# Patient Record
Sex: FEMALE | Race: WHITE | NOT HISPANIC OR LATINO | Employment: FULL TIME | URBAN - METROPOLITAN AREA
[De-identification: names, ages, dates, MRNs, and addresses within clinical notes are randomized per-mention and may not be internally consistent; named-entity substitution may affect disease eponyms.]

---

## 2017-08-02 ENCOUNTER — APPOINTMENT (OUTPATIENT)
Dept: LAB | Facility: HOSPITAL | Age: 32
End: 2017-08-02
Payer: COMMERCIAL

## 2017-08-02 ENCOUNTER — TRANSCRIBE ORDERS (OUTPATIENT)
Dept: LAB | Facility: HOSPITAL | Age: 32
End: 2017-08-02

## 2017-08-02 DIAGNOSIS — Z00.8 ENCOUNTER FOR OTHER GENERAL EXAMINATION: Primary | ICD-10-CM

## 2017-08-02 DIAGNOSIS — Z00.8 ENCOUNTER FOR OTHER GENERAL EXAMINATION: ICD-10-CM

## 2017-08-02 LAB
CHOLEST SERPL-MCNC: 198 MG/DL (ref 50–200)
EST. AVERAGE GLUCOSE BLD GHB EST-MCNC: 111 MG/DL
HBA1C MFR BLD: 5.5 % (ref 4.2–6.3)
HDLC SERPL-MCNC: 59 MG/DL (ref 40–60)
LDLC SERPL CALC-MCNC: 119 MG/DL (ref 0–100)
TRIGL SERPL-MCNC: 100 MG/DL

## 2017-08-02 PROCEDURE — 83036 HEMOGLOBIN GLYCOSYLATED A1C: CPT

## 2017-08-02 PROCEDURE — 36415 COLL VENOUS BLD VENIPUNCTURE: CPT

## 2017-08-02 PROCEDURE — 80061 LIPID PANEL: CPT

## 2017-12-18 ENCOUNTER — OFFICE VISIT (OUTPATIENT)
Dept: URGENT CARE | Facility: CLINIC | Age: 32
End: 2017-12-18
Payer: COMMERCIAL

## 2017-12-18 PROCEDURE — S9088 SERVICES PROVIDED IN URGENT: HCPCS

## 2017-12-18 PROCEDURE — 99213 OFFICE O/P EST LOW 20 MIN: CPT

## 2017-12-19 NOTE — PROGRESS NOTES
Assessment  1  Strain of right shoulder, initial encounter (840 9) (Z94 [id number])    Plan  Strain of right shoulder, initial encounter    · MethylPREDNISolone 4 MG Oral Tablet Therapy Pack (Medrol); 24mg day 1 thendecrease by 4 mg po daily for 6 days   · Naproxen 375 MG Oral Tablet; TAKE 1 TABLET 3 TIMES DAILY AS NEEDED    Discussion/Summary  Discussion Summary:   Discussed dx of right shoulder strain instructed to rest ice use NSAID as needed and follow up with PCP  Medication Side Effects Reviewed: Possible side effects of new medications were reviewed with the patient/guardian today  Understands and agrees with treatment plan: The treatment plan was reviewed with the patient/guardian  The patient/guardian understands and agrees with the treatment plan   Counseling Documentation With Imm: The patient was counseled regarding instructions for management,-- patient and family education,-- importance of compliance with treatment  total time of encounter was 25 minutes-- and-- 10 minutes was spent counseling  Follow Up Instructions: Follow Up with your Primary Care Provider in 3-5 days  If your symptoms worsen, go to the nearest [facility] Emergency Department  Chief Complaint  1  Shoulder Pain  Chief Complaint Free Text Note Form: c/o worsening pain in shoulder since yesterday  History of Present Illness  HPI: 28year old female at urgent care with chief complaint of right shoulder pain since lifting something at grocery store yesterday  She states she does a lot of lifting at her job but the pain got worse and really starting to bother her yesterday has not used nay OTC medications   Hospital Based Practices Required Assessment:  Pain Assessment  the patient states they have pain  The pain is located in the shoulder  (on a scale of 0 to 10, the patient rates the pain at 0, at times ranging as high as 8 )  Abuse And Domestic Violence Screen   Yes, the patient is safe at home  -- The patient states no one is hurting them  Depression And Suicide Screen  No, the patient has not had thoughts of hurting themself  Readiness To Learn: Receptive  Barriers To Learning: none  Preferred Learning: verbal  Education Completed: disease/condition,-- medications-- and-- further treatment/follow-up  Teaching Method: verbal  Person Taught: patient  Evaluation Of Learning: verbalized/demonstrated understanding   Shoulder Pain: RODRICK NULL presents with complaints of shoulder pain  Associated symptoms include swelling-- and-- decreased range of motion, but-- no clicking,-- no shoulder bruising,-- no instability,-- no redness,-- no warmth,-- no numbness in the arm,-- no weakness in the arm,-- no pain in the arm,-- no paresthesias,-- no pain in the neck,-- no chest pain,-- no pain in other joints,-- no fever,-- no localized rash-- and-- no generalized rash  Review of Systems  Focused-Female:  Constitutional: No fever, no chills, feels well, no tiredness, no recent weight gain or loss  ENT: no ear ache, no loss of hearing, no nosebleeds or nasal discharge, no sore throat or hoarseness  Cardiovascular: no complaints of slow or fast heart rate, no chest pain, no palpitations, no leg claudication or lower extremity edema  Respiratory: no complaints of shortness of breath, no wheezing, no dyspnea on exertion, no orthopnea or PND  Breasts: no complaints of breast pain, breast lump or nipple discharge  Gastrointestinal: no complaints of abdominal pain, no constipation, no nausea or diarrhea, no vomiting, no bloody stools  Genitourinary: no complaints of dysuria, no incontinence, no pelvic pain, no dysmenorrhea, no vaginal discharge or abnormal vaginal bleeding  Musculoskeletal: as noted in HPI  Integumentary: no complaints of skin rash or lesion, no itching or dry skin, no skin wounds  Neurological: no complaints of headache, no confusion, no numbness or tingling, no dizziness or fainting     ROS Reviewed:   ROS reviewed  Active Problems  1  Acute bacterial bronchitis (466 0,041 9) (J20 8,B96 89)   2  Acute otitis media, left (382 9) (H66 92)   3  Mild concussion (850 9) (S06 0X9A)   4  Renal colic (613 8) (X44)    Past Medical History  1  History of Headache (784 0) (R51)   2  History of Sinus Tachycardia (427 89)   3  History of Urinary tract infection (599 0) (N39 0)  Active Problems And Past Medical History Reviewed: The active problems and past medical history were reviewed and updated today  Family History  Family History    1  Family history of Anemia   2  Family history of Cervical dysplasia   3  Family history of Diabetes   4  Family history of Headache   5  Family history of Heart disease   6  Family history of Hypertension (V17 49)   7  Family history of Hypertension   8  Family history of Urinary tract infection  Family History Reviewed: The family history was reviewed and updated today  Social History   · Former smoker (O89 24) (E90 029)  Social History Reviewed: The social history was reviewed and updated today  The social history was reviewed and is unchanged  Surgical History  Surgical History Reviewed: The surgical history was reviewed and updated today  Current Meds   1  Daily Multiple Vitamins TABS; Therapy: (Karin Glover) to Recorded   2  Mucinex 600 MG Oral Tablet Extended Release 12 Hour; as directed; Therapy: 51KEH3813 to (Last Rx:29Nov2016) Ordered   3  Naproxen  MG Oral Tablet Delayed Release; TAKE 1 TABLET EVERY 12 HOURS DAILY; Therapy: 13RXH1953 to (Evaluate:47Rfx7536)  Requested for: 15EPV2511; Last [id number] Ordered  Medication List Reviewed: The medication list was reviewed and updated today  Allergies  1  No Known Drug Allergies  2   Chocolate    Vitals  Signs   Recorded: 08QEC7780 09:32AM   Temperature: 99 4 F  Heart Rate: 90  Respiration: 16  Systolic: 794  Diastolic: 71  Weight: 004 lb   BMI Calculated: 25 06  BSA Calculated: 1 71  O2 Saturation: 100  Pain Scale: 8    Physical Exam   Constitutional  General appearance: No acute distress, well appearing and well nourished  Eyes  Conjunctiva and lids: No swelling, erythema or discharge  Pupils and irises: Equal, round and reactive to light  Ears, Nose, Mouth, and Throat  External inspection of ears and nose: Normal    Oropharynx: Normal with no erythema, edema, exudate or lesions  Pulmonary  Respiratory effort: No increased work of breathing or signs of respiratory distress  Auscultation of lungs: Clear to auscultation  Cardiovascular  Palpation of heart: Normal PMI, no thrills  Auscultation of heart: Normal rate and rhythm, normal S1 and S2, without murmurs  Lymphatic  Palpation of lymph nodes in neck: No lymphadenopathy  Musculoskeletal  Gait and station: Normal    Digits and nails: Normal without clubbing or cyanosis  Inspection/palpation of joints, bones, and muscles: Abnormal   Appearance - normal  Palpation - normal except as noted: right shoulder tenderness        Signatures   Electronically signed by : Tahmina Live NP; Dec 18 2017  9:49AM EST                       (Author)    Electronically signed by : KATYA Sánchez ; Dec 18 2017  3:01PM EST                       (Co-author)

## 2018-01-10 NOTE — RESULT NOTES
Verified Results  * CT HEAD WO CONTRAST [id number] 02:13PM Teena Rodriguez Order Number: [id number]    Called Dr, office example not our procotcol , wo only necessary  the nurse that ok     Test Name Result Flag Reference   CT HEAD WO CONTRAST (Report)     CT BRAIN - WITHOUT CONTRAST     INDICATION: Concussion  Trauma 8 days prior  Nausea vomiting headache and photophobia  COMPARISON: None  TECHNIQUE: CT examination of the brain was performed  In addition to axial images, coronal reformatted images were created and submitted for interpretation  Examination was performed utilizing techniques to minimize radiation dose, including the use    of dose reduction software  IMAGE QUALITY: Diagnostic  FINDINGS:      PARENCHYMA: No intracranial mass, mass effect or midline shift  No acute intracranial hemorrhage  No CT signs of acute infarction  VENTRICLES AND EXTRA-AXIAL SPACES: Normal for patient's age  VISUALIZED ORBITS AND PARANASAL SINUSES: Unremarkable  CALVARIUM AND EXTRACRANIAL SOFT TISSUES:  Normal        IMPRESSION:     No acute intracranial abnormality         Workstation performed: [id number]     Signed by:   Supriya Middleton DO   5/4/16

## 2018-01-12 NOTE — MISCELLANEOUS
Message  Return to work or school evisit:   Charmaine Gibson is under my professional care   She was evaluated by myself via video conference on 11/29/16             Signatures   Electronically signed by : KATYA Torres ; Nov 29 2016  1:19PM EST                       (Author)

## 2018-01-15 NOTE — PROGRESS NOTES
Assessment    1  Acute bacterial bronchitis (466 0,041 9) (J20 8,B96 89)   2  Acute otitis media, left (382 9) (H66 92)    Plan  Acute bacterial bronchitis, Acute otitis media, left    · Clarithromycin 500 MG Oral Tablet; TAKE 1 TABLET EVERY 12 HOURS DAILY   · Mucinex 600 MG Oral Tablet Extended Release 12 Hour ([facility]); as  directed    Discussion/Summary  Possible side effects of new medications were reviewed with the patient/guardian today  The treatment plan was reviewed with the patient/guardian  The patient/guardian understands and agrees with the treatment plan     Follow Up with your Primary Care Provider in 4-5 days  If your symptoms worsen follow up at the nearest [facility] Emergency Room  Chief Complaint    1  Cold Symptoms    History of Present Illness    RODRICK NULL presents with complaints of gradual onset of constant episodes of moderate cold symptoms  Episodes started about 10 days ago  Symptoms are worsening  Associated symptoms include nasal congestion, post nasal drainage and productive cough, but no facial pressure and no facial pain  The patient presents with complaints of sudden onset of moderate left ear pain, described as sharp, non-radiating starting about 2 days ago  Review of Systems    Constitutional: as noted in HPI    ENT: as noted in HPI  Cardiovascular: no complaints of slow or fast heart rate, no chest pain, no palpitations, no leg claudication or lower extremity edema  Respiratory: as noted in HPI  Gastrointestinal: no complaints of abdominal pain, no constipation, no nausea or diarrhea, no vomiting, no bloody stools  Active Problems    1  Mild concussion (850 9) (S06 0X9A)   2  Renal colic (276 4) (A49)    Past Medical History    1  History of Headache (784 0) (R51)   2  History of Sinus Tachycardia (427 89)   3  History of Urinary tract infection (599 0) (N39 0)    Family History  Family History    1  Family history of Anemia   2   Family history of Cervical dysplasia   3  Family history of Diabetes   4  Family history of Headache   5  Family history of Heart disease   6  Family history of Hypertension (V17 49)   7  Family history of Hypertension   8  Family history of Urinary tract infection    Social History    · Former smoker (Q25 38) (S60 222)    Current Meds   1  Daily Multiple Vitamins TABS; Therapy: (Donnella Overcast) to Recorded   2  Naproxen  MG Oral Tablet Delayed Release; TAKE 1 TABLET EVERY 12 HOURS   DAILY; Therapy: 01GYX6024 to (Evaluate:22Aqv2820)  Requested for: 79XNP9418; Last   Rx:93Osm1020 Ordered    Allergies    1  No Known Drug Allergies    2  Chocolate    Observations Made  Pt is mildly ill appearing and calling from work  Pt appears in NAD  Speaking in full sentences with a mildly congested, nasal voice  No PTP of the sinuses  No PTP of the left ear/pinnae  No palpable anterior lymph nodes  Message    RODRICK NULL is under my professional care   She was evaluated by myself via video conference on 11/29/16             Signatures   Electronically signed by : KATYA Green ; Nov 29 2016  1:19PM EST                       (Author)

## 2018-01-17 ENCOUNTER — ALLSCRIPTS OFFICE VISIT (OUTPATIENT)
Dept: OTHER | Facility: OTHER | Age: 33
End: 2018-01-17

## 2018-01-17 DIAGNOSIS — M75.51 BURSITIS OF RIGHT SHOULDER: ICD-10-CM

## 2018-01-17 DIAGNOSIS — M67.921 UNSPECIFIED DISORDER OF SYNOVIUM AND TENDON, RIGHT UPPER ARM: ICD-10-CM

## 2018-01-22 VITALS
HEIGHT: 64 IN | BODY MASS INDEX: 24.92 KG/M2 | DIASTOLIC BLOOD PRESSURE: 90 MMHG | HEART RATE: 96 BPM | WEIGHT: 146 LBS | SYSTOLIC BLOOD PRESSURE: 141 MMHG

## 2018-01-23 VITALS
TEMPERATURE: 99.4 F | HEART RATE: 90 BPM | RESPIRATION RATE: 16 BRPM | WEIGHT: 146 LBS | OXYGEN SATURATION: 100 % | BODY MASS INDEX: 25.06 KG/M2 | SYSTOLIC BLOOD PRESSURE: 129 MMHG | DIASTOLIC BLOOD PRESSURE: 71 MMHG

## 2018-01-23 NOTE — MISCELLANEOUS
Message  Return to work or school:   RODRICK NULL is under my professional care  She was seen in my office on 1/17/18       Can return to full work, sports and activities as tolerated by pain  There are no restrictions  Nora Johnson DO  Signatures   Electronically signed by :  Nora Johnson DO; Jan 17 2018  3:18PM EST                       (Author)    Electronically signed by : Ryann Garcia MD; Jan 17 2018  4:35PM EST                       (Author)

## 2018-01-25 ENCOUNTER — EVALUATION (OUTPATIENT)
Dept: PHYSICAL THERAPY | Facility: OTHER | Age: 33
End: 2018-01-25
Payer: COMMERCIAL

## 2018-01-25 DIAGNOSIS — M67.921 UNSPECIFIED DISORDER OF SYNOVIUM AND TENDON, RIGHT UPPER ARM: Primary | ICD-10-CM

## 2018-01-25 DIAGNOSIS — M75.51 BURSITIS OF RIGHT SHOULDER: ICD-10-CM

## 2018-01-25 PROCEDURE — 97140 MANUAL THERAPY 1/> REGIONS: CPT | Performed by: PHYSICAL THERAPIST

## 2018-01-25 PROCEDURE — 97161 PT EVAL LOW COMPLEX 20 MIN: CPT | Performed by: PHYSICAL THERAPIST

## 2018-01-25 PROCEDURE — G8984 CARRY CURRENT STATUS: HCPCS | Performed by: PHYSICAL THERAPIST

## 2018-01-25 PROCEDURE — 97110 THERAPEUTIC EXERCISES: CPT | Performed by: PHYSICAL THERAPIST

## 2018-01-25 PROCEDURE — G8985 CARRY GOAL STATUS: HCPCS | Performed by: PHYSICAL THERAPIST

## 2018-01-25 RX ORDER — NAPROXEN 375 MG/1
220 TABLET ORAL 2 TIMES DAILY WITH MEALS
COMMUNITY
End: 2020-05-08

## 2018-01-25 NOTE — PROGRESS NOTES
PT Evaluation     Today's date: 2018  Patient name: Kristi Soliz  : 1985  MRN: [id number]  Referring provider: Bella Rivera MD  Dx: No diagnosis found  Assessment  Impairments: abnormal muscle firing, abnormal muscle tone, abnormal or restricted ROM, abnormal movement, activity intolerance, impaired physical strength and pain with function  Functional limitations: Patient is unable to perform instrumental acitivites of daily living without pain (i e  work activiites, driving and lifting)  Understanding of Dx/Px/POC: good   Prognosis: good    Goals  Short Term:  Pt will report decreased levels of pain by at least 2 subjective ratings in 4 weeks  Pt will demonstrate improved ROM by at least 10 degrees in 4 weeks  Pt will demonstrate improved strength by 1/2 grade  Long Term:   Pt will be independent in their HEP in 8 weeks  Pt will be be pain free with IADL's  Pt will demonstrate improved FOTO score   Patient's Goal:   "I want to be able to do my job without asking for help  I also want to be able to drive my car which is stick shift "     Plan    Planned modality interventions: low level laser therapy, TENS and cryotherapy  Planned therapy interventions: home exercise program, therapeutic training, therapeutic exercise, therapeutic activities, stretching, strengthening, patient education, neuromuscular re-education, manual therapy, joint mobilization, flexibility and functional ROM exercises  Frequency: 2x week  Duration in weeks: 12  Treatment plan discussed with: patient        Subjective Evaluation    History of Present Illness  Onset date: Approximately 6 months ago; recent episode began in December  Mechanism of injury: Patient explained that she believes the injury may be due to wear and tear  There was no single event which led to her R shoulder pain  However she remembers one instance that made her realize she needed to go to the doctor    Patient reports she was grocery shopping  She said she was unable to lift a 50 lbs bag of feed  Patient did not undergo an x-ray at this time  History of playing softball  She does a lot of [place] activity and lifting  Not a recurrent problem Quality of life: fair (She said she is able to do IADLs but she is noticing more pain than previously  )    Pain  Current pain ratin  At best pain ratin  At worst pain ratin  Location: R shoulder   Quality: radiating and sharp  Relieving factors: medications, ice, change in position and rest  Aggravating factors: overhead activity and lifting      Diagnostic Tests  No diagnostic tests performed  Treatments  Previous treatment: medication  Current treatment: physical therapy  Patient Goals  Patient goals for therapy: decreased pain, increased motion, increased strength, independence with ADLs/IADLs and return to sport/leisure activities  Patient goal: "I want to be able to do my job without asking for help  I also want to be able to drive my car which is stick shift "         Objective     Postural Observations  Seated posture: good  Standing posture: good  Correction of posture: makes symptoms better        Palpation   Left   Muscle spasm in the upper trapezius  Right   Muscle spasm in the upper trapezius  Tenderness of the supraspinatus and upper trapezius       Cervical/Thoracic Screen   Cervical range of motion within normal limits with the following exceptions: Cervical PROM WNL   OA WNL   Decreased mobility with cervical side glides on the left   CRLF - bilaterally   Alar Ligament -   Sharp's Pursner -     Thoracic range of motion within normal limits with the following exceptions: Hypomobility noted in the mid thoracic spine   No raised or elevated ribs noted at this time   Responded well to mobilization     Active Range of Motion   Left Shoulder   Flexion: 180 degrees   Extension: 60 degrees   Abduction: 180 degrees   External rotation BTH: T2   Internal rotation BTB: T7     Right Shoulder   Flexion: 135 degrees   Extension: 50 degrees   Abduction: 90 degrees   External rotation BTH: C4   Internal rotation BTB: L5     Additional Active Range of Motion Details  Quality of movement on the R is poor and abnormal    Passive Range of Motion   Left Shoulder   Flexion: WFL  Abduction: WFL  External rotation 90°: WFL  Internal rotation 90°: WFL    Right Shoulder   Flexion: 150 degrees   Abduction: 150 degrees   External rotation 90°: 75 degrees   Internal rotation 90°: 20 degrees     Scapular Mobility     Right Shoulder     Scapular Mobility beyond 90° FF   Upward rotation: excessive    Strength/Myotome Testing     Left Shoulder     Planes of Motion   Flexion: 5   Extension: 5   Abduction: 5   External rotation at 0°: 5   Internal rotation at 0°: 5     Right Shoulder     Planes of Motion   Flexion: 4+ (P!)   Extension: 4+ (P!)   Abduction: 3+ (P!)   External rotation at 0°: 4 (P!)   Internal rotation at 0°: 3+ (P!)     Tests     Right Shoulder   Positive belly press, empty can, full can, Hawkin's, horn blower, lift-off and Speed's         Precautions: Standard    Daily Treatment Diary   Manual           PROM           Inf/post glides                                             Exercise Diary           scap squeezes          scap protraction          cervical AROM          UT stretch          LS stretch          thoracic ext          Pendulums AP          Pendulums ML          Pendulums CC          Pendulums CW          "fish tails"           Elbow flex/ext PROM                               pulleys          Finger ladder flex          Finger ladder scaption          Table slides flexion          Table slides scaption          Table slides ER          Supine cane flexion          Supine cane scaption          Supine cane ER          Shoulder Iso x 6              Modalities           CP PRN

## 2018-01-29 ENCOUNTER — OFFICE VISIT (OUTPATIENT)
Dept: PHYSICAL THERAPY | Facility: OTHER | Age: 33
End: 2018-01-29
Payer: COMMERCIAL

## 2018-01-29 DIAGNOSIS — M67.921 UNSPECIFIED DISORDER OF SYNOVIUM AND TENDON, RIGHT UPPER ARM: Primary | ICD-10-CM

## 2018-01-29 DIAGNOSIS — M75.51 BURSITIS OF RIGHT SHOULDER: ICD-10-CM

## 2018-01-29 PROCEDURE — 97110 THERAPEUTIC EXERCISES: CPT

## 2018-01-29 PROCEDURE — 97140 MANUAL THERAPY 1/> REGIONS: CPT

## 2018-01-29 PROCEDURE — 97112 NEUROMUSCULAR REEDUCATION: CPT

## 2018-01-29 NOTE — PROGRESS NOTES
Daily Note     Today's date: 2018  Patient name: Alphonso Meckel  : 1985  MRN: [id number]  Referring provider: Melina Hummel MD  Dx:   Encounter Diagnoses   Name Primary?  Unspecified disorder of synovium and tendon, right upper arm Yes    Bursitis of right shoulder                   Subjective: Patient states she was sore after IE however she was able to do yoga yesterday and perform HEP over the weekend  She denies pain today  Objective: See treatment diary below  Precautions: Standard     Daily Treatment Diary   Manual   18               PROM   10'               Inf/post glides  By PT GC  1'                T-s foam rolling   2'               T-s gr5 mob-by PT GC  2'                                       Exercise Diary   18               scap squeezes  5"x20               scap protraction                 cervical AROM                 UT stretch  10"x10               LS stretch  10"x10               thoracic ext                 Pendulums AP                 Pendulums ML                 Pendulums CC                 Pendulums CW                 "fish tails"                  Elbow flex/ext PROM                                                     pulleys  5' Flexion               Finger ladder flex                 Finger ladder scaption                 Table slides flexion  10"x10               Table slides scaption                 Table slides ER                 Supine cane flexion                 Supine cane scaption                 Supine cane ER 10"x10               Shoulder Iso x 6                       Modalities                  CP PRN                                                             Assessment: Tolerated treatment fair  Patient could benefit from continued PT  Guarded with PROM and guarded with AROM  Verbal cues required for Vaishali for relaxation  Plan: Progress treatment as tolerated

## 2018-02-01 ENCOUNTER — OFFICE VISIT (OUTPATIENT)
Dept: PHYSICAL THERAPY | Facility: OTHER | Age: 33
End: 2018-02-01
Payer: COMMERCIAL

## 2018-02-01 DIAGNOSIS — M75.51 BURSITIS OF RIGHT SHOULDER: ICD-10-CM

## 2018-02-01 DIAGNOSIS — M67.921 UNSPECIFIED DISORDER OF SYNOVIUM AND TENDON, RIGHT UPPER ARM: Primary | ICD-10-CM

## 2018-02-01 PROCEDURE — 97112 NEUROMUSCULAR REEDUCATION: CPT

## 2018-02-01 PROCEDURE — 97140 MANUAL THERAPY 1/> REGIONS: CPT

## 2018-02-01 PROCEDURE — 97110 THERAPEUTIC EXERCISES: CPT

## 2018-02-01 NOTE — PROGRESS NOTES
Daily Note     Today's date: 2018  Patient name: Zan Contreras  : 1985  MRN: [id number]  Referring provider: Adia Sandoval MD  Dx:   Encounter Diagnoses   Name Primary?  Unspecified disorder of synovium and tendon, right upper arm Yes    Bursitis of right shoulder                   Subjective: Patient reports onset of intense numbness along ulnar distribution to the elbow over the past 24 hours  Typically N/T occurs with ER but subsides quickly with movement  Difficulty sleeping last night  Objective: See treatment diary below  Precautions: Standard     Daily Treatment Diary   Manual   18             PROM   10'  5'             Inf/post glides  By PT GC  1'  NP              T-s foam rolling   2'  NP             T-s gr5 mob-by PT GC  2'  2'             Ulnar nerve glide- by PT GC   1'         CTJ gr 5- by PT GC    2'                   Exercise Diary   18             scap squeezes  5"x20  5" x20             scap protraction                 cervical AROM                 UT stretch  10"x10  10" x10             LS stretch  10"x10  10"x10             thoracic ext                 Pendulums AP                 Pendulums ML                 Pendulums CC                 Pendulums CW                 "fish tails"                  Elbow flex/ext PROM                                                     Pulleys- flexion  5' Flexion   5'             Finger ladder flex                 Finger ladder scaption                 Table slides flexion  10"x10  10" x 10             Table slides scaption                 Table slides ER                 Supine cane flexion                 Supine cane scaption                 Supine cane ER 10"x10  NP             Shoulder Iso x 6                       Modalities                  CP PRN    10'                                                         Assessment: Tolerated treatment fair   Patient could benefit from continued PT  ( + ) for ulnar neural tension  Significant improvement following manual intervention  Educated on self ulnar nerve glide  Improved [place] AROM  Limited tolerance to PROM today  Plan: Progress treatment as tolerated

## 2018-02-05 ENCOUNTER — APPOINTMENT (OUTPATIENT)
Dept: PHYSICAL THERAPY | Facility: OTHER | Age: 33
End: 2018-02-05
Payer: COMMERCIAL

## 2018-02-08 ENCOUNTER — OFFICE VISIT (OUTPATIENT)
Dept: PHYSICAL THERAPY | Facility: OTHER | Age: 33
End: 2018-02-08
Payer: COMMERCIAL

## 2018-02-08 DIAGNOSIS — M67.921 UNSPECIFIED DISORDER OF SYNOVIUM AND TENDON, RIGHT UPPER ARM: Primary | ICD-10-CM

## 2018-02-08 DIAGNOSIS — M75.51 BURSITIS OF RIGHT SHOULDER: ICD-10-CM

## 2018-02-08 PROCEDURE — 97140 MANUAL THERAPY 1/> REGIONS: CPT

## 2018-02-08 PROCEDURE — 97112 NEUROMUSCULAR REEDUCATION: CPT

## 2018-02-08 PROCEDURE — 97110 THERAPEUTIC EXERCISES: CPT

## 2018-02-08 NOTE — PROGRESS NOTES
Daily Note     Today's date: 2018  Patient name: Jasper Corral  : 1985  MRN: [id number]  Referring provider: Alex Paz MD  Dx:   Encounter Diagnoses   Name Primary? 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 without symptoms  Objective: See treatment diary below  Precautions: Standard     Daily Treatment Diary   Manual   18           PROM   10'  5'  8'           Inf/post glides  By PT GC  1'  NP  2'            T-s foam rolling   2'  NP  NP           T-s gr5 mob-by PT GC  2'  2'  NP           Ulnar nerve glide- by PT GC   1' NP        CTJ gr 5- by PT GC    2' NP                 Exercise Diary   18           scap squeezes  5"x20  5" x20  np           scap protraction                 cervical AROM                 UT stretch  10"x10  10" x10  10" x10           LS stretch  10"x10  10"x10  10"x10           thoracic ext                 Pendulums AP                 Pendulums ML                 Pendulums CC                 Pendulums CW                 "fish tails"                  Elbow flex/ext PROM                                                     Pulleys- flexion  5' Flexion   5'  5'           Finger ladder flex                 Finger ladder scaption                 Table slides flexion  10"x10  10" x 10  np           Table slides scaption                 Table slides ER                 Supine cane flexion                 Supine cane scaption                 Supine cane ER 10"x10  NP  np           S/L  ER   2x10       Prone rows   2x10       TB shoulder x 4      OTB x  20ea                  Modalities                CP PRN    10'  NP                                                       Assessment: Tolerated treatment well   Overall improved tolerance to treatment  PROM WNL today with no pain reported at end range  Slight discomfort continues anterior shoulder  Able to progress to light PRE's today; assess Nv  Cues for improved scap depression with retraction when performing prone row and TB x 4  Patient could benefit from continued PT  Plan: Progress treatment as tolerated

## 2018-02-12 ENCOUNTER — OFFICE VISIT (OUTPATIENT)
Dept: PHYSICAL THERAPY | Facility: OTHER | Age: 33
End: 2018-02-12
Payer: COMMERCIAL

## 2018-02-12 DIAGNOSIS — M75.51 BURSITIS OF RIGHT SHOULDER: ICD-10-CM

## 2018-02-12 DIAGNOSIS — M67.921 UNSPECIFIED DISORDER OF SYNOVIUM AND TENDON, RIGHT UPPER ARM: Primary | ICD-10-CM

## 2018-02-12 PROCEDURE — 97140 MANUAL THERAPY 1/> REGIONS: CPT | Performed by: PHYSICAL THERAPIST

## 2018-02-12 PROCEDURE — 97110 THERAPEUTIC EXERCISES: CPT | Performed by: PHYSICAL THERAPIST

## 2018-02-12 PROCEDURE — 97530 THERAPEUTIC ACTIVITIES: CPT | Performed by: PHYSICAL THERAPIST

## 2018-02-12 PROCEDURE — 97112 NEUROMUSCULAR REEDUCATION: CPT | Performed by: PHYSICAL THERAPIST

## 2018-02-12 NOTE — PROGRESS NOTES
Daily Note     Today's date: 2018  Patient name: Gina Dey  : 1985  MRN: [id number]  Referring provider: Chong Wolfe MD  Dx:   Encounter Diagnoses   Name Primary?  Unspecified disorder of synovium and tendon, right upper arm Yes    Bursitis of right shoulder      08:00AM-08:15AM = IEP   08:15-09:00AM= 1 on 1 entire duration   Subjective: "I feel much better  I still need to improve my strength but my ROM is doing much better "   Objective: See treatment diary below  Precautions: Standard  Daily Treatment Diary   Manual   18         PROM   10'  5'  8' [facility]         Inf/post glides  By PT GC  1'  NP  2' [facility]          T-s foam rolling   2'  NP  NP [facility]         T-s gr5 mob-by PT GC  2'  2'  NP [facility]         Ulnar nerve glide- by PT GC   1' NP [facility]       CTJ gr 5- by PT GC    2' NP [facility]               Exercise Diary   18         scap squeezes  5"x20  5" x20  np           UT stretch  10"x10  10" x10  10" x10           LS stretch  10"x10  10"x10  10"x10                             Pulleys- flexion  5' Flexion   5'  5'  5'          Table slides flexion  10"x10  10" x 10  np           Supine cane ER 10"x10  NP  np           S/L  ER   2x10 #2 3 x 10      Prone rows   2x10 #5 3 x 10      Push Up Plus    1 x 10      PNF Patterns    Green   1 x 15 ea      Itsy Bitsy TB     PTB   3x ea CC/ CW       UBE     3' FWD/  3' RET      Scap Punches    5# 20 x 5"       Scap ABCs    5# 1x      Prone Ys/Ts/Is    1 x 15                TB shoulder x 4      OTB x  20ea  GTB   x20 ea               Modalities              CP PRN    10'  NP  Def                                                     Assessment: Tolerated treatment well  Patient is doing very well  PT updated exercises today  Provide updated HEP nv  She told PT at the conclusion of today's session she was a little sore  Continue to monitor  PT recommended icing if she gets sore tonight          Plan: Progress treatment as tolerated

## 2018-02-15 ENCOUNTER — OFFICE VISIT (OUTPATIENT)
Dept: PHYSICAL THERAPY | Facility: OTHER | Age: 33
End: 2018-02-15
Payer: COMMERCIAL

## 2018-02-15 DIAGNOSIS — M67.921 UNSPECIFIED DISORDER OF SYNOVIUM AND TENDON, RIGHT UPPER ARM: Primary | ICD-10-CM

## 2018-02-15 DIAGNOSIS — M75.51 BURSITIS OF RIGHT SHOULDER: ICD-10-CM

## 2018-02-15 PROCEDURE — 97110 THERAPEUTIC EXERCISES: CPT

## 2018-02-15 PROCEDURE — 97530 THERAPEUTIC ACTIVITIES: CPT

## 2018-02-15 PROCEDURE — 97112 NEUROMUSCULAR REEDUCATION: CPT

## 2018-02-15 PROCEDURE — 97140 MANUAL THERAPY 1/> REGIONS: CPT

## 2018-02-15 NOTE — PROGRESS NOTES
Daily Note     Today's date: 2/15/2018  Patient name: Nicky James  : 1985  MRN: [id number]  Referring provider: Soo Johnson MD  Dx:   Encounter Diagnoses   Name Primary?  Unspecified disorder of synovium and tendon, right upper arm Yes    Bursitis of right shoulder      N/A= IEP   07: = 1 on 1 entire duration     Subjective: Patient denies shoulder pain today, but reports muscular soreness throughout scapular region  She experiences "catching" in posterior shoulder at times  Objective: See treatment diary below    Precautions: Standard    Daily Treatment Diary   Manual   1/29/18  2/1  2/8  2/12  2/15       PROM   10'  5'  8' [facility]  x8'       Inf/post glides  By PT GC  1'  NP  2' [facility]  np        T-s foam rolling   2'  NP  NP [facility]  np       T-s gr5 mob-by PT GC  2'  2'  NP [facility]  np       Ulnar nerve glide- by PT GC   1' NP [facility] np      CTJ gr 5- by PT GC    2' NP [facility]  np             Exercise Diary   1/29/18  2/1  2/8  2/12  2/15       scap squeezes  5"x20  5" x20  np    dc       UT stretch  10"x10  10" x10  10" x10    hep       LS stretch  10"x10  10"x10  10"x10    hep                         Pulleys- flexion  5' Flexion   5'  5'  5'   5'       Table slides flexion  10"x10  10" x 10  np    hep       Supine cane ER 10"x10  NP  np    hep       S/L  ER   2x10 #2 3 x 10 2# 3x10     Prone rows   2x10 #5 3 x 10 5# 3x10     Push Up Plus    1 x 10 1x10     PNF Patterns    Green   1 x 15 ea 1x15 ea  Itsy Bitsy TB     PTB   3x ea CC/ CW  PTB 3x      UBE     3' FWD/  3' RET 3'/3'     Scap Punches    5# 20 x 5"  5# 30x     Scap ABCs    5# 1x 5# 1x     Prone Ys/Ts/Is    1 x 15 1x15 ea  TB shoulder x 4      OTB x  20ea  GTB   x20 ea  GTB x20       Rhomboid stretch     10"x10           Modalities              CP PRN    10'  NP  Def                                                   Assessment: Tolerated treatment well   Deferred several Vaishali to HEP and provided updated strengthening HEP  Added rhomboid stretch to address mid back tightness with reported relief  Plan: Progress treatment as tolerated

## 2018-02-19 ENCOUNTER — OFFICE VISIT (OUTPATIENT)
Dept: PHYSICAL THERAPY | Facility: OTHER | Age: 33
End: 2018-02-19
Payer: COMMERCIAL

## 2018-02-19 DIAGNOSIS — M75.51 BURSITIS OF RIGHT SHOULDER: ICD-10-CM

## 2018-02-19 DIAGNOSIS — M67.921 UNSPECIFIED DISORDER OF SYNOVIUM AND TENDON, RIGHT UPPER ARM: Primary | ICD-10-CM

## 2018-02-19 PROCEDURE — 97110 THERAPEUTIC EXERCISES: CPT | Performed by: PEDIATRICS

## 2018-02-19 PROCEDURE — 97112 NEUROMUSCULAR REEDUCATION: CPT | Performed by: PEDIATRICS

## 2018-02-19 PROCEDURE — 97140 MANUAL THERAPY 1/> REGIONS: CPT | Performed by: PEDIATRICS

## 2018-02-19 PROCEDURE — 97530 THERAPEUTIC ACTIVITIES: CPT | Performed by: PEDIATRICS

## 2018-02-19 NOTE — PROGRESS NOTES
Daily Note     Today's date: 2018  Patient name: Tomas Whalen  : 1985  MRN: [id number]  Referring provider: Holly Zapata MD  Dx:   Encounter Diagnosis     ICD-10-CM    1  Unspecified disorder of synovium and tendon, right upper arm M67 921    2  Bursitis of right shoulder M75 51                   Subjective: Pt  States she is sore from shoveling snow yesterday  Objective: See treatment diary below  Precautions: Standard    Daily Treatment Diary   Manual   1/29/18  2/1  2/8  2/12  2/15  2/19     PROM   10'  5'  8' [facility]  x8'  8'     Inf/post glides  By PT GC  1'  NP  2' [facility]  np  np      T-s foam rolling   2'  NP  NP [facility]  np  np     T-s gr5 mob-by PT GC  2'  2'  NP [facility]  np  np     Ulnar nerve glide- by PT GC   1' NP [facility] np np     CTJ gr 5- by PT GC    2' NP [facility]  np  np           Exercise Diary   1/29/18  2/1  2/8  2/12  2/15  2/19     scap squeezes  5"x20  5" x20  np    dc  dc     UT stretch  10"x10  10" x10  10" x10    hep  hep     LS stretch  10"x10  10"x10  10"x10    hep  hep                       Pulleys- flexion  5' Flexion   5'  5'  5'   5'  5'     Table slides flexion  10"x10  10" x 10  np    hep  hep     Supine cane ER 10"x10  NP  np    hep  hep     S/L  ER   2x10 #2 3 x 10 2# 3x10 2# 3x 10    Prone rows   2x10 #5 3 x 10 5# 3x10 5# 3 x 10    Push Up Plus    1 x 10 1x10 1 x 10    PNF Patterns    Green   1 x 15 ea 1x15 ea  1 x 15 ea    Itsy Bitsy TB     PTB   3x ea CC/ CW  PTB 3x  PTB 3x    UBE     3' FWD/  3' RET 3'/3' 3'/3'    Scap Punches    5# 20 x 5"  5# 30x 5# 30x    Scap ABCs    5# 1x 5# 1x 5# 1x     Prone Ys/Ts/Is    1 x 15 1x15 ea  1 x 20              TB shoulder x 4      OTB x  20ea  GTB   x20 ea  GTB x20  GTB x 20     Rhomboid stretch     10"x10 10" x 10          Modalities       2         CP PRN    10'  NP  Def                                                   Assessment: Tolerated treatment well   Did not progress secondary to complaints of increased soreness today  PNF patterns most difficult exercise  Patient demonstrated fatigue post treatment, exhibited good technique with therapeutic exercises and would benefit from continued PT      Plan: Progress treatment as tolerated

## 2018-02-22 ENCOUNTER — APPOINTMENT (OUTPATIENT)
Dept: PHYSICAL THERAPY | Facility: OTHER | Age: 33
End: 2018-02-22
Payer: COMMERCIAL

## 2018-02-26 ENCOUNTER — OFFICE VISIT (OUTPATIENT)
Dept: PHYSICAL THERAPY | Facility: OTHER | Age: 33
End: 2018-02-26
Payer: COMMERCIAL

## 2018-02-26 DIAGNOSIS — M67.921 UNSPECIFIED DISORDER OF SYNOVIUM AND TENDON, RIGHT UPPER ARM: Primary | ICD-10-CM

## 2018-02-26 DIAGNOSIS — M75.51 BURSITIS OF RIGHT SHOULDER: ICD-10-CM

## 2018-02-26 PROCEDURE — 97140 MANUAL THERAPY 1/> REGIONS: CPT | Performed by: PHYSICAL THERAPIST

## 2018-02-26 PROCEDURE — 97110 THERAPEUTIC EXERCISES: CPT | Performed by: PHYSICAL THERAPIST

## 2018-02-26 PROCEDURE — 97530 THERAPEUTIC ACTIVITIES: CPT | Performed by: PHYSICAL THERAPIST

## 2018-02-26 PROCEDURE — 97112 NEUROMUSCULAR REEDUCATION: CPT | Performed by: PHYSICAL THERAPIST

## 2018-02-26 NOTE — PROGRESS NOTES
Daily Note     Today's date: 2018  Patient name: Tomas Whalen  : 1985  MRN: [id number]  Referring provider: Holly Zapata MD  Dx:   Encounter Diagnosis     ICD-10-CM    1  Unspecified disorder of synovium and tendon, right upper arm M67 921    2  Bursitis of right shoulder M75 51      1 on 1 entire duration  Start Time: 0800  Stop Time: 0845  Total time in clinic (min): 45 minutes    Subjective: Patient and PT discussed discharge  Patient tolerated this very well  No new issues were reported  She told PT "I was able to carry a 50 lb of feed without any issues "      Objective: See treatment diary below    Precautions: Standard    Daily Treatment Diary   Manual   1/29/18  2/1  2/8  2/12  2/15  2/19  2/26   PROM   10'  5'  8' [facility]  x8'  8' [facility]   Inf/post glides  By PT GC  1'  NP  2' [facility]  np  np [facility]    T-s foam rolling   2'  NP  NP [facility]  np  np [facility]   T-s gr5 mob-by PT GC  2'  2'  NP [facility]  np  np [facility]   Ulnar nerve glide- by PT GC   1' NP [facility] np np     CTJ gr 5- by PT GC    2' NP [facility]  np  np           Exercise Diary   1/29/18  2/1  2/8  2/12  2/15  2/19  2/26   scap squeezes  5"x20  5" x20  np    dc  dc     UT stretch  10"x10  10" x10  10" x10    hep  hep     LS stretch  10"x10  10"x10  10"x10    hep  hep                       Pulleys- flexion  5' Flexion   5'  5'  5'   5'  5'     Table slides flexion  10"x10  10" x 10  np    hep  hep     Supine cane ER 10"x10  NP  np    hep  hep     S/L  ER   2x10 #2 3 x 10 2# 3x10 2# 3x 10 3# 3 x 10   Prone rows   2x10 #5 3 x 10 5# 3x10 5# 3 x 10 8# 3 x 10   Push Up Plus    1 x 10 1x10 1 x 10    PNF Patterns    Green   1 x 15 ea 1x15 ea  1 x 15 ea    Itsy Bitsy TB     PTB   3x ea CC/ CW  PTB 3x  PTB 3x PTB 3x   UBE     3' FWD/  3' RET 3'/3' 3'/3' 5'/5'   Scap Punches    5# 20 x 5"  5# 30x 5# 30x 8# 30x   Scap ABCs    5# 1x 5# 1x 5# 1x  #8 1x   Prone Ys/Ts/Is    1 x 15 1x15 ea  1 x 20 1 x 20             TB shoulder x 4      OTB x  20ea   GTB   x20 ea  GTB x20  GTB x 20  GTB x 30 ea   Finger ladder flexion       10 x 10"    FR protocol        6 x 1' ea   Rhomboid stretch     10"x10 10" x 10 10 x 10"          Modalities     2/1 2/8 2/12 2/26       CP PRN    10'  NP  Def  def                                                 Assessment: Tolerated treatment well  PT progress patient today and added several new exercises  Patient exhibited good technique with therapeutic exercises and would benefit from continued PT  PT and patient discussed discharge  PT will perform RE next visit  Plan: Potential discharge next visit

## 2018-03-01 ENCOUNTER — EVALUATION (OUTPATIENT)
Dept: PHYSICAL THERAPY | Facility: OTHER | Age: 33
End: 2018-03-01
Payer: COMMERCIAL

## 2018-03-01 DIAGNOSIS — M67.921 UNSPECIFIED DISORDER OF SYNOVIUM AND TENDON, RIGHT UPPER ARM: Primary | ICD-10-CM

## 2018-03-01 DIAGNOSIS — M75.51 BURSITIS OF RIGHT SHOULDER: ICD-10-CM

## 2018-03-01 PROCEDURE — 97140 MANUAL THERAPY 1/> REGIONS: CPT | Performed by: PHYSICAL THERAPIST

## 2018-03-01 PROCEDURE — 97530 THERAPEUTIC ACTIVITIES: CPT | Performed by: PHYSICAL THERAPIST

## 2018-03-01 PROCEDURE — G8986 CARRY D/C STATUS: HCPCS | Performed by: PHYSICAL THERAPIST

## 2018-03-01 PROCEDURE — G8985 CARRY GOAL STATUS: HCPCS | Performed by: PHYSICAL THERAPIST

## 2018-03-01 PROCEDURE — 97110 THERAPEUTIC EXERCISES: CPT | Performed by: PHYSICAL THERAPIST

## 2018-03-01 NOTE — PROGRESS NOTES
PT Re-Evaluation  and PT Discharge    Today's date: 3/1/2018  Patient name: Sobia Velasquez  : 1985  MRN: [id number]  Referring provider: Keila Jerez MD  Dx:   Encounter Diagnoses   Name Primary?  Unspecified disorder of synovium and tendon, right upper arm Yes    Bursitis of right shoulder      1 on  entire duration  Start Time: 0815  Stop Time: 0845  Total time in clinic (min): 30 minutes    Assessment  Impairments: abnormal muscle firing, abnormal muscle tone, abnormal or restricted ROM, abnormal movement, activity intolerance, impaired physical strength and pain with function    Assessment details: Sobia Velasquez is a 28 y o  female who presents with complaints of unspecified disorder of synovium and tendon, right upper arm  (primary encounter diagnosis) and bursitis of the right shoulder  No further referral appears necessary at this time based upon examination results  Patient reports and demonstrates no issues with strength, ROM and ability to complete ADLs as well as IADLs  PT and patient discussed discharge previously  Today PT performed RE and discharged patient from physical therapy services  Please contact me if you have any questions or recommendations  Thank you for the opportunity to share in [facility]  Understanding of Dx/Px/POC: good   Prognosis: good    Goals  Short Term:  Pt will report decreased levels of pain by at least 2 subjective ratings in 4 weeks- MET  Pt will demonstrate improved ROM by at least 10 degrees in 4 weeks- MET  Pt will demonstrate improved strength by 1/2 grade- MET  Long Term:   Pt will be independent in their HEP in 8 weeks- MET  Pt will be be pain free with IADL's- MET  Pt will demonstrate improved FOTO score- MET   Patient's Goal:   "I want to be able to do my job without asking for help    I also want to be able to drive my car which is stick shift "- MET    Plan  Planned modality interventions: low level laser therapy, TENS and cryotherapy  Planned therapy interventions: home exercise program, therapeutic training, therapeutic exercise, therapeutic activities, stretching, strengthening, patient education, neuromuscular re-education, manual therapy, joint mobilization, flexibility and functional ROM exercises  Treatment plan discussed with: patient  Plan details: Patient has been discharged from PT at this time  Subjective Evaluation    History of Present Illness  Onset date: Approximately 6 months ago; recent episode began in December  Mechanism of injury: Patient reports she is 90-95%  Patient said she does not have any issues with tasks she does on a regular basis or activities she likes to do (i e  Throwing horseshoes)  Patient told PT she is ready to be discharged today  Patient said she does not need an updated HEP because she has done them so frequently they are engraved in her brain  Not a recurrent problem   Quality of life: good (She said she is able to do IADLs but she is noticing more pain than previously  )    Pain  Current pain ratin  At best pain ratin  At worst pain ratin  Location: R shoulder       Diagnostic Tests  No diagnostic tests performed  Treatments  Previous treatment: medication  Current treatment: physical therapy  Patient Goals  Patient goals for therapy: decreased pain, increased motion, increased strength, independence with ADLs/IADLs and return to sport/leisure activities  Patient goal: "I want to be able to do my job without asking for help  I also want to be able to drive my car which is stick shift "         Objective     Postural Observations  Seated posture: good  Standing posture: good  Correction of posture: makes symptoms better        Palpation   Left   Hypertonic in the upper trapezius  Right   Hypertonic in the upper trapezius  Tenderness of the supraspinatus       Cervical/Thoracic Screen   Cervical range of motion within normal limits with the following exceptions: Cervical PROM WNL   OA WNL  Decreased mobility with side glides to the L --> unremarkable    CRLF - bilaterally   Alar Ligament -   Sharp's Pursner -     Thoracic range of motion within normal limits with the following exceptions: Hypomobility noted in the mid thoracic spine --> unremarkable   No raised or elevated ribs noted at this time   Responded well to mobilization     Active Range of Motion   Left Shoulder   Flexion: 180 degrees   Extension: 60 degrees   Abduction: 180 degrees   External rotation BTH: T2   Internal rotation BTB: T7     Right Shoulder   Flexion: 180 degrees   Extension: 65 degrees   Abduction: 180 degrees   External rotation BTH: T2   Internal rotation BTB: T7     Additional Active Range of Motion Details  Quality of movement on the R is poor and abnormal --> unremarkable     Passive Range of Motion   Left Shoulder   Flexion: WFL  Abduction: WFL  External rotation 90°: WFL  Internal rotation 90°: WFL    Right Shoulder   Flexion: 180 degrees   Abduction: 180 degrees   External rotation 90°: 100 degrees   Internal rotation 90°: 80 degrees     Strength/Myotome Testing     Left Shoulder     Planes of Motion   Flexion: 5   Extension: 5   Abduction: 5   External rotation at 0°: 5   Internal rotation at 0°: 5     Right Shoulder     Planes of Motion   Flexion: 5 (P!)   Extension: 5 (P!)   Abduction: 5 (P!)   External rotation at 0°: 5 (P!)   Internal rotation at 0°: 5 (P!)     Tests     Right Shoulder   Negative belly press, empty can, full can, Hawkin's, horn blower, lift-off and Speed's       Precautions: Standard    Daily Treatment Diary   Manual   1/29/18  2/1  2/8  2/12  2/15  2/19  2/26   PROM   10'  5'  8' [facility]  x8'  8' [facility]   Inf/post glides  By PT GC  1'  NP  2' [facility]  np  np [facility]    T-s foam rolling   2'  NP  NP [facility]  np  np [facility]   T-s gr5 mob-by PT GC  2'  2'  NP [facility]  np  np [facility]   Ulnar nerve glide- by PT GC   1' NP [facility] np np     CTJ gr 5- by PT GC    2' NP [facility]  np  np           Exercise Diary   1/29/18  2/1  2/8  2/12  2/15  2/19  2/26   scap squeezes  5"x20  5" x20  np    dc  dc     UT stretch  10"x10  10" x10  10" x10    hep  hep     LS stretch  10"x10  10"x10  10"x10    hep  hep                       Pulleys- flexion  5' Flexion   5'  5'  5'   5'  5'     Table slides flexion  10"x10  10" x 10  np    hep  hep     Supine cane ER 10"x10  NP  np    hep  hep     S/L  ER   2x10 #2 3 x 10 2# 3x10 2# 3x 10 3# 3 x 10   Prone rows   2x10 #5 3 x 10 5# 3x10 5# 3 x 10 8# 3 x 10   Push Up Plus    1 x 10 1x10 1 x 10    PNF Patterns    Green   1 x 15 ea 1x15 ea  1 x 15 ea    Itsy Bitsy TB     PTB   3x ea CC/ CW  PTB 3x  PTB 3x PTB 3x   UBE     3' FWD/  3' RET 3'/3' 3'/3' 5'/5'   Scap Punches    5# 20 x 5"  5# 30x 5# 30x 8# 30x   Scap ABCs    5# 1x 5# 1x 5# 1x  #8 1x   Prone Ys/Ts/Is    1 x 15 1x15 ea  1 x 20 1 x 20             TB shoulder x 4      OTB x  20ea   GTB   x20 ea  GTB x20  GTB x 20  GTB x 30 ea   Finger ladder flexion       10 x 10"    FR protocol        6 x 1' ea   Rhomboid stretch     10"x10 10" x 10 10 x 10"          Modalities     2/1 2/8 2/12 2/26       CP PRN    10'  NP  Def  def                                             Flowsheet Rows    Flowsheet Row Most Recent Value   PT/OT G-Codes   Current Score  94   Projected Score  74   FOTO information reviewed  Yes   Assessment Type  Discharge   G code set  Carrying, Moving & Handling Objects   Carrying, Moving and Handling Objects Goal Status ()  CJ   Carrying, Moving and Handling Objects Discharge Status ()  CI

## 2018-03-20 ENCOUNTER — OFFICE VISIT (OUTPATIENT)
Dept: OBGYN CLINIC | Facility: OTHER | Age: 33
End: 2018-03-20
Payer: COMMERCIAL

## 2018-03-20 VITALS
HEART RATE: 86 BPM | HEIGHT: 64 IN | WEIGHT: 154 LBS | SYSTOLIC BLOOD PRESSURE: 112 MMHG | DIASTOLIC BLOOD PRESSURE: 79 MMHG | BODY MASS INDEX: 26.29 KG/M2

## 2018-03-20 DIAGNOSIS — M25.511 RIGHT SHOULDER PAIN, UNSPECIFIED CHRONICITY: Primary | ICD-10-CM

## 2018-03-20 PROCEDURE — 99213 OFFICE O/P EST LOW 20 MIN: CPT | Performed by: INTERNAL MEDICINE

## 2018-03-20 RX ORDER — MULTIVITAMIN/IRON/FOLIC ACID 18MG-0.4MG
TABLET ORAL
COMMUNITY

## 2018-03-20 NOTE — PROGRESS NOTES
Assessment/Plan:  Assessment/Plan   Diagnoses and all orders for this visit:    Right shoulder pain, unspecified chronicity      We discussed with Thiago Josue that, based on today's physical exam, she has return of full pain-free range of motion and comparable strength in her rotator cuff as compared to contralateral extremity  At this time she is considered clinically resolve, and can return to work and recreational activities as tolerated without limitations or restrictions  She will be discharged from our care, and will be seen moving forward on an as-needed basis if her symptoms should return  Subjective:   Patient ID: Jose Schwartz is a 28 y o  female  Kera Lozano is a pleasant 22-year-old RHD female who presents today for follow-up evaluation of right shoulder pain x5 +months  On today's presentation she reports that she is completely resolved  She has been discharged from formal physical therapy as of 1 week, however she continues with her prescribed HEP at least 2-3 times per week  She denies any subsequent bruising, swelling, numbness, tingling, instability, mechanical, or radiating symptoms  The following portions of the patient's history were reviewed and updated as appropriate: allergies, current medications, past family history, past medical history, past social history, past surgical history and problem list     Review of Systems   Constitutional: Negative  HENT: Negative  Eyes: Negative  Respiratory: Negative  Cardiovascular: Negative  Gastrointestinal: Negative  Endocrine: Negative  Genitourinary: Negative  Musculoskeletal:        As noted in HPI/PE   Skin: Negative  Allergic/Immunologic: Negative  Neurological: Negative  Hematological: Negative  Psychiatric/Behavioral: Negative          Objective:    Vitals:    03/20/18 0803   BP: 112/79   Patient Position: Sitting   Pulse: 86   Weight: 69 9 kg (154 lb)   Height: 5' 4" (1 626 m)       Right Shoulder Exam   Right shoulder exam is normal     Tenderness   The patient is experiencing no tenderness  Range of Motion   Active Abduction:  170 normal   Passive Abduction:  170 normal   Extension:  50 normal   Forward Flexion:  170 normal   External Rotation:  90 normal   Internal Rotation 0 degrees:  T7 normal   Internal Rotation 90 degrees:  90 normal     Muscle Strength   Abduction: 5/5   Internal Rotation: 5/5   External Rotation: 5/5   Supraspinatus: 5/5   Subscapularis: 5/5   Biceps: 5/5     Tests   Apprehension: negative  Cross Arm: negative  Drop Arm: negative  Hawkin's test: negative  Impingement: negative  Sulcus: absent    Other   Erythema: absent  Scars: absent  Sensation: normal  Pulse: present    Comments:  No obvious deformity noted  No tenderness to palpation  Full active and passive range of motion as compared to contralateral extremity     - Empty can  - [place]'s  - Bear hug  - lift-off            Physical Exam   Constitutional: She is oriented to person, place, and time  She appears well-developed and well-nourished  HENT:   Right Ear: External ear normal    Left Ear: External ear normal    Nose: Nose normal    Eyes: Conjunctivae and EOM are normal  Pupils are equal, round, and reactive to light  Neck: Normal range of motion  Cardiovascular: Intact distal pulses  Pulmonary/Chest: Effort normal    Musculoskeletal: Normal range of motion  Neurological: She is alert and oriented to person, place, and time  Skin: Skin is warm and dry  Psychiatric: She has a normal mood and affect  Her behavior is normal  Judgment and thought content normal        No pertinent imaging to review this visit      Scribe Attestation    I,:   Devon Mortimer am acting as a scribe while in the presence of the attending physician :        I,:   Sonny Platt MD personally performed the services described in this documentation    as scribed in my presence :

## 2018-03-20 NOTE — PATIENT INSTRUCTIONS
Thank you for enrolling in [address] [facility]  Please follow the instructions below to securely access your online medical record  [employer] allows you to send messages to your doctor, view your test results, renew your prescriptions, schedule appointments, and more  How Do I Sign Up? 1  In your Internet browser, go to [email or web address]  REPLACE WITH REAL URL com   2  Click on the Sign Up Now link in the New User? box    3  Enter your [employer] Activation Code exactly as it appears below  You will not need to use this code after you have completed the sign-up process  If you do not sign up before the expiration date, you must request a new code  [employer] Activation Code: [email or web address]  Expires: 3/23/2018  8:28 AM    4  Enter the last four digits of your Social Security Number and your Date of Birth as indicated and click Next  You will be taken to the next sign-up page  5  Create a [employer] username  Think of one that is secure and easy to remember  6  Create a [employer] password  You can change your password at any time  7  Choose a security question, enter your answer, and click Next  This can be used to access [employer] if you forget your password  8  Select your communication preference  Enter a valid e-mail address if you would like to receive e-mail notifications when new information is available in [facility] [facility]  9  Click Sign In  You can now view your medical record  Additional Information  If you have [email or web address] or call [phone] to talk to our [address] staff  Remember, [employer] is NOT to be used for urgent needs  For medical emergencies, dial 911

## 2018-03-20 NOTE — LETTER
March 20, 2018     Patient: Zan Contreras   YOB: 1985   Date of Visit: 3/20/2018       To Whom it May Concern:    Zan Contreras is under my professional care  She was seen in my office on 3/20/2018  She is cleared to full work responsibilities as tolerated without limitations or restrictions  If you have any questions or concerns, please don't hesitate to call           Sincerely,          Dusty Hernandez MD        CC: Zan Concepcione

## 2018-03-28 ENCOUNTER — OFFICE VISIT (OUTPATIENT)
Dept: FAMILY MEDICINE CLINIC | Facility: CLINIC | Age: 33
End: 2018-03-28
Payer: COMMERCIAL

## 2018-03-28 VITALS
SYSTOLIC BLOOD PRESSURE: 118 MMHG | WEIGHT: 149.8 LBS | TEMPERATURE: 99 F | BODY MASS INDEX: 25.57 KG/M2 | HEART RATE: 80 BPM | RESPIRATION RATE: 16 BRPM | HEIGHT: 64 IN | DIASTOLIC BLOOD PRESSURE: 76 MMHG

## 2018-03-28 DIAGNOSIS — D23.9 DYSPLASTIC NEVUS: ICD-10-CM

## 2018-03-28 DIAGNOSIS — L23.89 ALLERGIC CONTACT DERMATITIS DUE TO OTHER AGENTS: Primary | ICD-10-CM

## 2018-03-28 PROCEDURE — 99214 OFFICE O/P EST MOD 30 MIN: CPT | Performed by: FAMILY MEDICINE

## 2018-03-28 RX ORDER — CETIRIZINE HYDROCHLORIDE 10 MG/1
10 TABLET ORAL DAILY
Qty: 30 TABLET | Refills: 0
Start: 2018-03-28 | End: 2020-05-08

## 2018-03-28 RX ORDER — TRIAMCINOLONE ACETONIDE 5 MG/G
CREAM TOPICAL 3 TIMES DAILY
Qty: 30 G | Refills: 0 | Status: SHIPPED | OUTPATIENT
Start: 2018-03-28 | End: 2020-05-08

## 2018-03-28 NOTE — ASSESSMENT & PLAN NOTE
Has a small 2 cmm dysplastic nevus that is not inflamed, but seems to be at the center of the contact dermatitis  Pt will RTC in ~1 month after dermatitis has resolved and likely will do punch biopsy at that time

## 2018-03-28 NOTE — PROGRESS NOTES
FAMILY MEDICINE PROGRESS NOTE  Davidson Victoria 28 y o  female   MRN: [id number]      ASSESSMENT and PLAN:  Davidson Victoria is a 28 y o  female with:     Dysplastic nevus  Has a small 2 cmm dysplastic nevus that is not inflamed, but seems to be at the center of the contact dermatitis  Pt will RTC in ~1 month after dermatitis has resolved and likely will do punch biopsy at that time  Allergic contact dermatitis due to other agents  Does seem to have dermatitis around the mole, but does NOT involve the mole itself  Does have excoriations as well  -Add moderate potency steroid, Kenalog  -Restart Zyrtec daily x 1 week  -Use aloe and calamine topically  -Call if symptoms dont improve and would do prednisone burst    SUBJECTIVE:  Davidson Victoria is a 28 y o  female who presents today with a chief complaint of Nevus (Itching and redness)  1 5 weeks worth of itching  Has had a mole on her back her whole life  Thought it may be related to her bra strap  No history of allergies other than chocolate, does have sensitive skin, no new detergents  Rash   This is a new problem  The problem has been gradually worsening since onset  The affected locations include the back  The rash is characterized by itchiness  She was exposed to nothing  Pertinent negatives include no anorexia, congestion, diarrhea, fatigue, fever, joint pain, rhinorrhea, shortness of breath or sore throat  The treatment provided mild relief  Review of Systems   Constitutional: Negative for fatigue and fever  HENT: Negative for congestion, rhinorrhea and sore throat  Respiratory: Negative for shortness of breath  Gastrointestinal: Negative for anorexia and diarrhea  Musculoskeletal: Negative for joint pain  Skin: Positive for rash  I have reviewed the patient's PMH, Social History, Medication List and Allergies        OBJECTIVE:  /76 (BP Location: Left leg, Patient Position: Sitting, Cuff Size: Large)   Pulse 80   Temp 99 °F (37 2 °C) (Tympanic)   Resp 16   Ht 5' 4" (1 626 m)   Wt 67 9 kg (149 lb 12 8 oz)   LMP 03/16/2018 (Exact Date)   BMI 25 71 kg/m²   Physical Exam   Constitutional: She appears well-developed and well-nourished  No distress  Pulmonary/Chest:           Skin: Skin is warm and dry  Rash noted  She is not diaphoretic  No erythema  No pallor

## 2018-03-28 NOTE — ASSESSMENT & PLAN NOTE
Does seem to have dermatitis around the mole, but does NOT involve the mole itself  Does have excoriations as well  -Add moderate potency steroid, Kenalog  -Restart Zyrtec daily x 1 week  -Use aloe and calamine topically  -Call if symptoms dont improve and would do prednisone burst

## 2018-03-28 NOTE — PATIENT INSTRUCTIONS
Atypical Mole   WHAT YOU NEED TO KNOW:   An atypical mole, or dysplastic nevus, is a mole that usually has an abnormal shape, size, or color  Atypical moles can develop on skin that is protected from the sun and skin that is exposed to sunlight  Your risk is increased if you have family members with atypical moles  Most atypical moles do not develop into skin cancer  Your risk for skin cancer is higher if you have many atypical moles  DISCHARGE INSTRUCTIONS:   Contact your healthcare provider if:   · You see a new mole that does not look like your other moles  · You have a mole that changes in height, shape, or texture  · You have a mole that changes color  · You have a mole that starts to itch, bleed, or ooze fluid  · You have questions or concerns about your condition or care  Follow up with your dermatologist as directed: You will need to return for regular skin exams  Write down your questions so you remember to ask them during your visits  Check your skin once a month:  Your dermatologist will show you how to do a self-exam of your moles  Protect your skin:   · Avoid sun exposure between 10 am and 4 pm   The sun is most intense during the middle of the day  · Sit in the shade if you are outside  Sit under an umbrella or sun shelter  · Do not use tanning beds  Tanning beds are not safer than a tan directly from the sun  · Apply a broad spectrum sunscreen with at least SPF 30, 20 minutes before you go outside  Use sunscreen on cloudy days as well  Apply sunscreen at least every 2 hours and after you swim or sweat  Apply to your ears, scalp, back of your hands, and the tops of your feet  These areas are easily forgotten  Use a lip balm that contains at least SPF 30  · Wear long-sleeved shirts and long pants or skirts  Wear a hat with a wide brim all the way around  The wide brim shades your face, ears, and the back of your neck   Wear large-framed sunglasses to protect your eyes   © 2017 [facility] Information is for End User's use only and may not be sold, redistributed or otherwise used for commercial purposes  All illustrations and images included in CareNotes® are the copyrighted property of A D A M , Inc  or Saman Chavarria  The above information is an  only  It is not intended as medical advice for individual conditions or treatments  Talk to your doctor, nurse or pharmacist before following any medical regimen to see if it is safe and effective for you

## 2018-04-23 ENCOUNTER — OFFICE VISIT (OUTPATIENT)
Dept: FAMILY MEDICINE CLINIC | Facility: CLINIC | Age: 33
End: 2018-04-23
Payer: COMMERCIAL

## 2018-04-23 VITALS
DIASTOLIC BLOOD PRESSURE: 74 MMHG | BODY MASS INDEX: 26.13 KG/M2 | SYSTOLIC BLOOD PRESSURE: 104 MMHG | RESPIRATION RATE: 16 BRPM | HEART RATE: 80 BPM | WEIGHT: 152.2 LBS | TEMPERATURE: 99.4 F

## 2018-04-23 DIAGNOSIS — D23.9 DYSPLASTIC NEVUS: Primary | ICD-10-CM

## 2018-04-23 PROCEDURE — 88305 TISSUE EXAM BY PATHOLOGIST: CPT | Performed by: PATHOLOGY

## 2018-04-23 PROCEDURE — 99213 OFFICE O/P EST LOW 20 MIN: CPT | Performed by: FAMILY MEDICINE

## 2018-04-23 PROCEDURE — 11400 EXC TR-EXT B9+MARG 0.5 CM<: CPT | Performed by: FAMILY MEDICINE

## 2018-04-23 PROCEDURE — 1036F TOBACCO NON-USER: CPT | Performed by: FAMILY MEDICINE

## 2018-04-23 NOTE — PROGRESS NOTES
FAMILY MEDICINE PROGRESS NOTE  Sj Brooks 28 y o  female   DATE: April 23, 2018     ASSESSMENT and PLAN:  Sj Brooks is a 28 y o  female with:     Dysplastic nevus  Dermatitis has resolved, but nevus persisted, so given irritation due to location, pt opted for removal   Details per procedure note, reviewed post-op care, will call once pathology results are available  Skin excision  Date/Time: 4/23/2018 9:15 AM  Performed by: Dwana Frankel Authorized by: Dwana Frankel Procedure Details - Skin Excision:     Number of Lesions:  1  Lesion 1:     Body area:  Trunk    Trunk location:  Back    Initial size (mm):  4       Final defect size (mm):  4    Malignancy: benign lesion      Destruction method comment:  Dermablade    Wound repair type: alluminum chloride  Area was prepped and cleaned with alcohol prep pads, numbed with 4cc of 1% lidocaine, lesion excised with dermablade, good hemostasis achieved, reviewed post-procedure care and lesion sent for pathology  SUBJECTIVE:  Sj Brooks is a 28 y o  female who presents today with a chief complaint of Nevus (Removal)  Pt here to re-evaluate the lesion on her back  The mole is not bothering her quite so much  Prior to this it was irritating and she put triamcinolone on it and it has significantly improved and the irritation is less  She has to wear sports bras so that her regular bras don't catch on it  Review of Systems   Constitutional: Negative for chills, fatigue and fever  Skin: Negative for color change, rash and wound  I have reviewed the patient's PMH, Social History, Medication List and Allergies  OBJECTIVE:  /74   Pulse 80   Temp 99 4 °F (37 4 °C) (Tympanic)   Resp 16   Wt 69 kg (152 lb 3 2 oz)   LMP 04/14/2018 (Exact Date)   BMI 26 13 kg/m²   Physical Exam   Constitutional: She appears well-developed and well-nourished  No distress  Skin: She is not diaphoretic  Markfrieda Cal Pulido MD

## 2018-04-25 ENCOUNTER — TELEPHONE (OUTPATIENT)
Dept: FAMILY MEDICINE CLINIC | Facility: CLINIC | Age: 33
End: 2018-04-25

## 2018-04-25 NOTE — TELEPHONE ENCOUNTER
Please call Cherelle Aparicio and let her know that her skin removal pathology was normal  She has what we thought it was, a benign nevus

## 2018-08-28 ENCOUNTER — APPOINTMENT (OUTPATIENT)
Dept: LAB | Facility: HOSPITAL | Age: 33
End: 2018-08-28
Payer: COMMERCIAL

## 2018-08-28 ENCOUNTER — TRANSCRIBE ORDERS (OUTPATIENT)
Dept: LAB | Facility: HOSPITAL | Age: 33
End: 2018-08-28

## 2018-08-28 DIAGNOSIS — Z00.8 HEALTH EXAMINATION IN POPULATION SURVEY: ICD-10-CM

## 2018-08-28 DIAGNOSIS — Z00.8 HEALTH EXAMINATION IN POPULATION SURVEY: Primary | ICD-10-CM

## 2018-08-28 LAB
CHOLEST SERPL-MCNC: 205 MG/DL (ref 50–200)
EST. AVERAGE GLUCOSE BLD GHB EST-MCNC: 105 MG/DL
HBA1C MFR BLD: 5.3 % (ref 4.2–6.3)
HDLC SERPL-MCNC: 66 MG/DL (ref 40–60)
LDLC SERPL CALC-MCNC: 126 MG/DL (ref 0–100)
NONHDLC SERPL-MCNC: 139 MG/DL
TRIGL SERPL-MCNC: 63 MG/DL

## 2018-08-28 PROCEDURE — 80061 LIPID PANEL: CPT

## 2018-08-28 PROCEDURE — 83036 HEMOGLOBIN GLYCOSYLATED A1C: CPT

## 2018-08-28 PROCEDURE — 36415 COLL VENOUS BLD VENIPUNCTURE: CPT

## 2019-02-13 ENCOUNTER — OFFICE VISIT (OUTPATIENT)
Dept: URGENT CARE | Age: 34
End: 2019-02-13
Payer: COMMERCIAL

## 2019-02-13 VITALS
HEART RATE: 93 BPM | DIASTOLIC BLOOD PRESSURE: 79 MMHG | TEMPERATURE: 100 F | RESPIRATION RATE: 20 BRPM | SYSTOLIC BLOOD PRESSURE: 136 MMHG | OXYGEN SATURATION: 98 %

## 2019-02-13 DIAGNOSIS — S66.811A STRAIN OF OTHER SPECIFIED MUSCLES, FASCIA AND TENDONS AT WRIST AND HAND LEVEL, RIGHT HAND, INITIAL ENCOUNTER: Primary | ICD-10-CM

## 2019-02-13 PROCEDURE — 99213 OFFICE O/P EST LOW 20 MIN: CPT | Performed by: FAMILY MEDICINE

## 2019-02-13 PROCEDURE — S9088 SERVICES PROVIDED IN URGENT: HCPCS | Performed by: FAMILY MEDICINE

## 2019-02-13 NOTE — LETTER
February 13, 2019     Patient: Arcadio Braga   YOB: 1985   Date of Visit: 2/13/2019       To Whom It May Concern:     It is my medical opinion that Arcadio Braga may return to light duty immediately with the following restrictions: No repetitive use of the right hand and wrist  restrictions effective for 5 days           Sincerely,        Trino Kiser PA-C    CC: No Recipients

## 2019-02-14 NOTE — PROGRESS NOTES
[facility] Now        NAME: Andreas Bland is a 35 y o  female  : 1985    MRN: [id number]  DATE: 2019  TIME: 8:13 PM    Assessment and Plan   Strain of other specified muscles, fascia and tendons at wrist and hand level, right hand, initial encounter [S66 811A]  1  Strain of other specified muscles, fascia and tendons at wrist and hand level, right hand, initial encounter           Patient Instructions       Follow up with PCP in 3-5 days  Proceed to  ER if symptoms worsen  Chief Complaint     Chief Complaint   Patient presents with    Hand Pain     pt states sudden onset of right thumb pain yesterday, denies any injury  History of Present Illness       Patient is here for evaluation of pain in her right thumb and hand extending up into her wrist   Patient states the pain started yesterday when she was at work  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 and was unable to use it by in the day  She denies any direct trauma or fall, prior injury  She denies any numbness, tingling        Review of Systems   Review of Systems      Current Medications       Current Outpatient Medications:     DAILY MULTIPLE VITAMINS/IRON TABS, Take by mouth, Disp: , Rfl:     cetirizine (ZyrTEC) 10 mg tablet, Take 1 tablet (10 mg total) by mouth daily (Patient not taking: Reported on 2019), Disp: 30 tablet, Rfl: 0    naproxen (NAPROSYN) 375 mg tablet, Take 220 mg by mouth 2 (two) times a day with meals, Disp: , Rfl:     triamcinolone (KENALOG) 0 5 % cream, Apply topically 3 (three) times a day (Patient not taking: Reported on 2019), Disp: 30 g, Rfl: 0    Current Allergies     Allergies as of 2019 - Reviewed 2019   Allergen Reaction Noted    Chocolate Hives 2016            The following portions of the patient's history were reviewed and updated as appropriate: allergies, current medications, past family history, past medical history, past social history, past surgical history and problem list      Past Medical History:   Diagnosis Date    ARM (anorectal malformation)     Concussion     Fractures     Head injury     Seasonal allergies     Sinus tachycardia        History reviewed  No pertinent surgical history  Family History   Problem Relation Age of Onset    Cancer Father     Diabetes Father     No Known Problems Brother     Anemia Family     Other Family         cervical dysplasia    Diabetes Family     Migraines Family     Heart disease Family     Hypertension Family     Urinary tract infection Family          Medications have been verified  Objective   /79 (BP Location: Left arm, Patient Position: Sitting)   Pulse 93   Temp 100 °F (37 8 °C) (Temporal)   Resp 20   LMP 01/22/2019   SpO2 98%        Physical Exam     Physical Exam   Constitutional: She is oriented to person, place, and time  She appears well-developed and well-nourished  No distress  HENT:   Head: Normocephalic and atraumatic  Musculoskeletal:   Range of motion of the right hand and wrist intact with pain on some range of motion mainly in the thenar eminence  There is some mild fullness to the thenar eminence  No ecchymosis  No erythema  No warmth  No crepitation   strength 4/5  The thumb opposition intact but limited to the small finger with pain and discomfort  Neurological: She is alert and oriented to person, place, and time  No sensory deficit  Skin: Skin is warm and dry  Psychiatric: She has a normal mood and affect  Her behavior is normal    Nursing note and vitals reviewed

## 2019-02-14 NOTE — PATIENT INSTRUCTIONS
Ice frequently over the next 48-72 hours    Take ibuprofen 200 mg 3 tablets every 8 hours with food for 5-7 days    Limit strenuous use of the right hand and wrist for 5-7 days      Discuss with your supervisor tomorrow regarding getting checked for possible occupational injury

## 2019-02-18 ENCOUNTER — HOSPITAL ENCOUNTER (OUTPATIENT)
Dept: RADIOLOGY | Age: 34
Discharge: HOME | End: 2019-02-18
Payer: COMMERCIAL

## 2019-02-18 ENCOUNTER — APPOINTMENT (OUTPATIENT)
Dept: URGENT CARE | Age: 34
End: 2019-02-18
Payer: OTHER MISCELLANEOUS

## 2019-02-18 DIAGNOSIS — M79.641 PAIN OF RIGHT HAND: ICD-10-CM

## 2019-02-18 DIAGNOSIS — M79.641 PAIN OF RIGHT HAND: Primary | ICD-10-CM

## 2019-02-18 PROCEDURE — G0382 LEV 3 HOSP TYPE B ED VISIT: HCPCS | Performed by: PREVENTIVE MEDICINE

## 2019-02-18 PROCEDURE — 99283 EMERGENCY DEPT VISIT LOW MDM: CPT | Performed by: PREVENTIVE MEDICINE

## 2019-02-18 PROCEDURE — 73130 X-RAY EXAM OF HAND: CPT

## 2019-02-18 PROCEDURE — 73110 X-RAY EXAM OF WRIST: CPT

## 2019-02-22 ENCOUNTER — APPOINTMENT (OUTPATIENT)
Dept: URGENT CARE | Age: 34
End: 2019-02-22
Payer: OTHER MISCELLANEOUS

## 2019-02-22 PROCEDURE — 99213 OFFICE O/P EST LOW 20 MIN: CPT | Performed by: PREVENTIVE MEDICINE

## 2019-02-28 ENCOUNTER — APPOINTMENT (OUTPATIENT)
Dept: URGENT CARE | Age: 34
End: 2019-02-28
Payer: OTHER MISCELLANEOUS

## 2019-02-28 PROCEDURE — 99213 OFFICE O/P EST LOW 20 MIN: CPT | Performed by: PREVENTIVE MEDICINE

## 2019-03-07 ENCOUNTER — APPOINTMENT (OUTPATIENT)
Dept: URGENT CARE | Age: 34
End: 2019-03-07
Payer: OTHER MISCELLANEOUS

## 2019-03-07 PROCEDURE — 99213 OFFICE O/P EST LOW 20 MIN: CPT | Performed by: PREVENTIVE MEDICINE

## 2020-04-28 ENCOUNTER — TELEPHONE (OUTPATIENT)
Dept: FAMILY MEDICINE CLINIC | Facility: CLINIC | Age: 35
End: 2020-04-28

## 2020-05-08 ENCOUNTER — TELEMEDICINE (OUTPATIENT)
Dept: FAMILY MEDICINE CLINIC | Facility: CLINIC | Age: 35
End: 2020-05-08

## 2020-05-08 VITALS — TEMPERATURE: 97.7 F

## 2020-05-08 DIAGNOSIS — Z20.828 EXPOSURE TO SARS-ASSOCIATED CORONAVIRUS: ICD-10-CM

## 2020-05-08 DIAGNOSIS — R43.2 AGEUSIA: Primary | ICD-10-CM

## 2020-05-08 DIAGNOSIS — R43.0 ANOSMIA: ICD-10-CM

## 2020-05-08 PROCEDURE — U0003 INFECTIOUS AGENT DETECTION BY NUCLEIC ACID (DNA OR RNA); SEVERE ACUTE RESPIRATORY SYNDROME CORONAVIRUS 2 (SARS-COV-2) (CORONAVIRUS DISEASE [COVID-19]), AMPLIFIED PROBE TECHNIQUE, MAKING USE OF HIGH THROUGHPUT TECHNOLOGIES AS DESCRIBED BY CMS-2020-01-R: HCPCS

## 2020-05-08 PROCEDURE — G2012 BRIEF CHECK IN BY MD/QHP: HCPCS | Performed by: FAMILY MEDICINE

## 2020-05-08 RX ORDER — ETONOGESTREL AND ETHINYL ESTRADIOL 11.7; 2.7 MG/1; MG/1
INSERT, EXTENDED RELEASE VAGINAL
COMMUNITY
Start: 2020-02-03

## 2020-05-09 LAB — SARS-COV-2 RNA SPEC QL NAA+PROBE: NOT DETECTED

## 2020-11-09 ENCOUNTER — LAB REQUISITION (OUTPATIENT)
Dept: LAB | Facility: HOSPITAL | Age: 35
End: 2020-11-09

## 2020-11-09 DIAGNOSIS — Z20.828 CONTACT WITH AND (SUSPECTED) EXPOSURE TO OTHER VIRAL COMMUNICABLE DISEASES: ICD-10-CM

## 2020-11-09 PROCEDURE — U0003 INFECTIOUS AGENT DETECTION BY NUCLEIC ACID (DNA OR RNA); SEVERE ACUTE RESPIRATORY SYNDROME CORONAVIRUS 2 (SARS-COV-2) (CORONAVIRUS DISEASE [COVID-19]), AMPLIFIED PROBE TECHNIQUE, MAKING USE OF HIGH THROUGHPUT TECHNOLOGIES AS DESCRIBED BY CMS-2020-01-R: HCPCS | Performed by: PHYSICIAN ASSISTANT

## 2020-11-10 LAB — SARS-COV-2 RNA SPEC QL NAA+PROBE: NOT DETECTED

## 2020-12-23 ENCOUNTER — IMMUNIZATIONS (OUTPATIENT)
Dept: FAMILY MEDICINE CLINIC | Facility: HOSPITAL | Age: 35
End: 2020-12-23

## 2020-12-23 DIAGNOSIS — Z23 ENCOUNTER FOR IMMUNIZATION: ICD-10-CM

## 2020-12-23 PROCEDURE — 0001A SARS-COV-2 / COVID-19 MRNA VACCINE (PFIZER-BIONTECH) 30 MCG: CPT

## 2020-12-23 PROCEDURE — 91300 SARS-COV-2 / COVID-19 MRNA VACCINE (PFIZER-BIONTECH) 30 MCG: CPT

## 2021-01-14 ENCOUNTER — IMMUNIZATIONS (OUTPATIENT)
Dept: FAMILY MEDICINE CLINIC | Facility: HOSPITAL | Age: 36
End: 2021-01-14

## 2021-01-14 DIAGNOSIS — Z23 ENCOUNTER FOR IMMUNIZATION: Primary | ICD-10-CM

## 2021-01-14 PROCEDURE — 91300 SARS-COV-2 / COVID-19 MRNA VACCINE (PFIZER-BIONTECH) 30 MCG: CPT

## 2021-01-14 PROCEDURE — 0002A SARS-COV-2 / COVID-19 MRNA VACCINE (PFIZER-BIONTECH) 30 MCG: CPT

## 2021-02-19 ENCOUNTER — COSMETIC (OUTPATIENT)
Dept: PLASTIC SURGERY | Facility: CLINIC | Age: 36
End: 2021-02-19

## 2021-02-19 VITALS — WEIGHT: 187 LBS | TEMPERATURE: 96.9 F | BODY MASS INDEX: 32.1 KG/M2

## 2021-02-19 DIAGNOSIS — Z41.1 ENCOUNTER FOR COSMETIC PROCEDURE: ICD-10-CM

## 2021-02-19 PROCEDURE — BOTOX1U PR BOTOX BY THE UNIT: Performed by: STUDENT IN AN ORGANIZED HEALTH CARE EDUCATION/TRAINING PROGRAM

## 2021-02-19 PROCEDURE — RECHECK: Performed by: STUDENT IN AN ORGANIZED HEALTH CARE EDUCATION/TRAINING PROGRAM

## 2021-02-19 NOTE — PROGRESS NOTES
Botox Consult     First time: yes  Allergies: chocolate  Blood thinners: none  Pregnant: no     Patient has never had botox, interested in maintenance for forehead mainly and corrugators  Will proceed with 20 units of botox to forehead and corrugators     Risks and benefits discussed, patient agreed to proceed       10 units to corrugators  10 units to forehead     Total 20 units, 30% off     Patient tolerated well, f/u in 3 months        MD Haydee Godinez [facility]   [address] [facility] [address]   Office: [phone]

## 2021-05-06 ENCOUNTER — TELEMEDICINE (OUTPATIENT)
Dept: FAMILY MEDICINE CLINIC | Facility: CLINIC | Age: 36
End: 2021-05-06
Payer: COMMERCIAL

## 2021-05-06 DIAGNOSIS — J30.2 SEASONAL ALLERGIC RHINITIS, UNSPECIFIED TRIGGER: ICD-10-CM

## 2021-05-06 DIAGNOSIS — Z13.220 SCREENING FOR HYPERLIPIDEMIA: ICD-10-CM

## 2021-05-06 DIAGNOSIS — R59.0 POSTERIOR CERVICAL LYMPHADENOPATHY: Primary | ICD-10-CM

## 2021-05-06 DIAGNOSIS — Z13.1 SCREENING FOR DIABETES MELLITUS: ICD-10-CM

## 2021-05-06 PROCEDURE — 99213 OFFICE O/P EST LOW 20 MIN: CPT | Performed by: FAMILY MEDICINE

## 2021-05-06 NOTE — PROGRESS NOTES
Virtual Regular Visit      Assessment/Plan:    Problem List Items Addressed This Visit     None      Visit Diagnoses     Posterior cervical lymphadenopathy    -  Primary    Seasonal allergic rhinitis, unspecified trigger        Screening for diabetes mellitus        Relevant Orders    Hemoglobin A1C    Screening for hyperlipidemia        Relevant Orders    Lipid Panel with Direct LDL reflex          Patient with localized posterior cervical lymphadenopathy with a singular enlarged lymph node  She has no systemic or constitutional symptoms, and given acuity of onset and tenderness on exam expect enlarged in though to be due to inflammatory process  Recommend observation for now though discussed if it does not resolve within 3-4 weeks with pursue further workup at that time with laboratory studies in consideration of referral to ENT for biopsy  Recommended ongoing treatment for seasonal allergies with oral antihistamine, nasal rinse and consideration of nasal corticosteroid  Patient due for physical, she will follow-up in the office and have blood work done for screening labs prior to her visit  Reason for visit is   Chief Complaint   Patient presents with    Virtual Regular Visit        Encounter provider Elizabet Van MD    Provider located at [place] [address]  [phone]      Recent Visits  No visits were found meeting these conditions  Showing recent visits within past 7 days and meeting all other requirements     Today's Visits  Date Type Provider Dept   05/06/21 Telemedicine MD Delvin Hickey   Showing today's visits and meeting all other requirements     Future Appointments  No visits were found meeting these conditions  Showing future appointments within next 150 days and meeting all other requirements        The patient was identified by name and date of birth   Sobia Velasquez was informed that this is a telemedicine visit and that the visit is being conducted through [address] [place] [address] Now and patient was informed that this is a secure, HIPAA-compliant platform  She agrees to proceed     My office door was closed  No one else was in the room  She acknowledged consent and understanding of privacy and security of the video platform  The patient has agreed to participate and understands they can discontinue the visit at any time  Patient is aware this is a billable service  Subjective  Luz Elena Stoll is a 28 y o  female who presents for virtual sick visit   HPI     She reports that she has had allergy symptoms for several weeks with itchy eyes, rhinorrhea, though this is normal for her  She takes Zyrtec in uses a nasal rinse  She reports that she woke up this morning and noticed a swollen lymph node in her neck, she has a solitary enlarged lymph node in her posterior chain on the right side  It is visibly enlarged underneath the skin  She does report she has dandruff and dry skin on her scalp for cheese in dandruff shampoo  She denies any lesions on her scalp, or any ear pain  She denies having fevers, chills, sore throat, night sweats, fatigue, change in appetite  She reports that this has happened before, when she was stressed , she reports that it resolved on its own  She did not require any workup or imaging when she have the enlarged lymph nodes in the past   She has a lot of stress with work currently  Past Medical History:   Diagnosis Date    ARM (anorectal malformation)     Concussion     Fractures     Head injury     Seasonal allergies     Sinus tachycardia        No past surgical history on file  Current Outpatient Medications   Medication Sig Dispense Refill    DAILY MULTIPLE VITAMINS/IRON TABS Take by mouth      etonogestrel-ethinyl estradiol (NUVARING) 0 12-0 015 MG/24HR vaginal ring        No current facility-administered medications for this visit           Allergies   Allergen Reactions    Chocolate - Food Allergy Hives       Review of Systems   Constitutional: Negative for activity change, appetite change, chills, fatigue, fever and unexpected weight change  HENT: Positive for congestion and rhinorrhea  Negative for ear discharge, ear pain, sinus pressure, sinus pain and sore throat  Eyes: Positive for itching  Respiratory: Negative for shortness of breath  Genitourinary: Negative for menstrual problem  Hematological: Positive for adenopathy  Psychiatric/Behavioral: The patient is nervous/anxious  Video Exam    There were no vitals filed for this visit  Physical Exam  Constitutional:       General: She is not in acute distress  Appearance: Normal appearance  She is normal weight  She is not ill-appearing or diaphoretic  HENT:      Head: Normocephalic and atraumatic  Right Ear: External ear normal       Left Ear: External ear normal       Nose: Nose normal       Mouth/Throat:      Mouth: Mucous membranes are moist    Eyes:      Extraocular Movements: Extraocular movements intact  Conjunctiva/sclera: Conjunctivae normal    Neck:      Musculoskeletal: Normal range of motion and neck supple  Pulmonary:      Effort: Pulmonary effort is normal  No respiratory distress  Musculoskeletal: Normal range of motion  Lymphadenopathy:      Cervical: Cervical adenopathy present  Right cervical: Posterior cervical adenopathy (Enlarged lymph node visible under skin, appears to be about 1 5 cm, patient reports that it is mildly tender to palpation ) present  Skin:     Findings: No erythema or rash  Neurological:      Mental Status: She is alert        Gait: Gait normal    Psychiatric:         Mood and Affect: Mood normal          Behavior: Behavior normal           I spent 15 minutes with patient today in which greater than 50% of the time was spent in counseling/coordination of care regarding enlarged lymph node      VIRTUAL VISIT Dorian Rodriguez Astrid Samayoa acknowledges that she has consented to an online visit or consultation  She understands that the online visit is based solely on information provided by her, and that, in the absence of a face-to-face physical evaluation by the physician, the diagnosis she receives is both limited and provisional in terms of accuracy and completeness  This is not intended to replace a full medical face-to-face evaluation by the physician  Vicki Kunz understands and accepts these terms

## 2021-09-14 ENCOUNTER — APPOINTMENT (OUTPATIENT)
Dept: LAB | Facility: HOSPITAL | Age: 36
End: 2021-09-14

## 2021-09-14 DIAGNOSIS — Z00.8 ENCOUNTER FOR OTHER GENERAL EXAMINATION: ICD-10-CM

## 2021-09-14 DIAGNOSIS — Z13.220 SCREENING FOR HYPERLIPIDEMIA: ICD-10-CM

## 2021-09-14 LAB
CHOLEST SERPL-MCNC: 200 MG/DL (ref 50–200)
EST. AVERAGE GLUCOSE BLD GHB EST-MCNC: 111 MG/DL
HBA1C MFR BLD: 5.5 %
HDLC SERPL-MCNC: 46 MG/DL
LDLC SERPL CALC-MCNC: 122 MG/DL (ref 0–100)
NONHDLC SERPL-MCNC: 154 MG/DL
TRIGL SERPL-MCNC: 159 MG/DL

## 2021-09-14 PROCEDURE — 80061 LIPID PANEL: CPT

## 2021-09-14 PROCEDURE — 36415 COLL VENOUS BLD VENIPUNCTURE: CPT

## 2021-09-14 PROCEDURE — 83036 HEMOGLOBIN GLYCOSYLATED A1C: CPT

## 2021-09-21 ENCOUNTER — OFFICE VISIT (OUTPATIENT)
Dept: URGENT CARE | Facility: CLINIC | Age: 36
End: 2021-09-21
Payer: COMMERCIAL

## 2021-09-21 VITALS
SYSTOLIC BLOOD PRESSURE: 118 MMHG | DIASTOLIC BLOOD PRESSURE: 82 MMHG | HEART RATE: 85 BPM | OXYGEN SATURATION: 99 % | WEIGHT: 170 LBS | BODY MASS INDEX: 29.02 KG/M2 | TEMPERATURE: 97.2 F | HEIGHT: 64 IN | RESPIRATION RATE: 18 BRPM

## 2021-09-21 DIAGNOSIS — H92.02 LEFT EAR PAIN: Primary | ICD-10-CM

## 2021-09-21 DIAGNOSIS — H65.02 ACUTE SEROUS OTITIS MEDIA OF LEFT EAR, RECURRENCE NOT SPECIFIED: ICD-10-CM

## 2021-09-21 PROCEDURE — G0382 LEV 3 HOSP TYPE B ED VISIT: HCPCS | Performed by: PHYSICIAN ASSISTANT

## 2021-09-21 RX ORDER — CETIRIZINE HYDROCHLORIDE 10 MG/1
10 TABLET ORAL DAILY
COMMUNITY

## 2021-09-21 NOTE — PROGRESS NOTES
[facility]    NAME: Sj Brooks is a 39 y o  female  : 1985    MRN: [id number]  DATE: 2021  TIME: 5:21 PM    Assessment and Plan   Left ear pain [H92 02]  1  Left ear pain     2  Acute serous otitis media of left ear, recurrence not specified         Patient Instructions     Patient Instructions   Recommend Flonase and Sudafed along with allergy medication  Go to the emergency room if symptoms are worsening, follow-up with PCP as needed  Chief Complaint     Chief Complaint   Patient presents with    Earache     left x 2 days    Dizziness       History of Present Illness   27-year-old female here with complaint of left ear pain that started this morning  Also complaining of dizziness and vertigo which happened this morning when she got up  Happened when she sat up from lying down  Since then she has not had the dizziness and vertigo but is still having some left ear discomfort and pain  Denies any nasal congestion, cough or sore throat  States that she normally has allergy symptoms but does not feel like her allergies are that bad  Review of Systems   Review of Systems   Constitutional: Negative for appetite change, chills and fever  HENT: Positive for ear pain  Negative for congestion, ear discharge, facial swelling, postnasal drip, sinus pressure, sneezing and sore throat  Respiratory: Negative for cough, shortness of breath and wheezing  Neurological: Positive for dizziness (this am)  Negative for headaches         Current Medications     Current Outpatient Medications:     cetirizine (ZyrTEC) 10 mg tablet, Take 10 mg by mouth daily, Disp: , Rfl:     DAILY MULTIPLE VITAMINS/IRON TABS, Take by mouth (Patient not taking: Reported on 2021), Disp: , Rfl:     etonogestrel-ethinyl estradiol (NUVARING) 0 12-0 015 MG/24HR vaginal ring, , Disp: , Rfl:     Current Allergies     Allergies as of 2021 - Reviewed 2021   Allergen Reaction Noted    Chocolate - food allergy Hives 04/28/2016          The following portions of the patient's history were reviewed and updated as appropriate: allergies, current medications, past family history, past medical history, past social history, past surgical history and problem list    Past Medical History:   Diagnosis Date    ARM (anorectal malformation)     Concussion     Fractures     Head injury     Seasonal allergies     Sinus tachycardia      History reviewed  No pertinent surgical history  Family History   Problem Relation Age of Onset   Екатерина Salcedo Cancer Father     Diabetes Father     No Known Problems Brother     Anemia Family     Other Family         cervical dysplasia    Diabetes Family     Migraines Family     Heart disease Family     Hypertension Family     Urinary tract infection Family      Social History     Socioeconomic History    Marital status: /Civil Union     Spouse name: Not on file    Number of children: Not on file    Years of education: Not on file    Highest education level: Not on file   Occupational History    Not on file   Tobacco Use    Smoking status: Former Smoker    Smokeless tobacco: Never Used    Tobacco comment: Socially years ago/8 years ago   Substance and Sexual Activity    Alcohol use: Yes     Alcohol/week: 2 0 standard drinks     Types: 1 Glasses of wine, 1 Cans of beer per week    Drug use: No    Sexual activity: Yes     Partners: Male     Birth control/protection: None   Other Topics Concern    Not on file   Social History Narrative    Not on file     Social Determinants of Health     Financial Resource Strain:     Difficulty of Paying Living Expenses:    Food Insecurity:     Worried About Running Out of Food in the Last Year:     Ran Out of Food in the Last Year:    Transportation Needs:     Lack of Transportation (Medical):      Lack of Transportation (Non-Medical):    Physical Activity:     Days of Exercise per Week:     Minutes of Exercise per Session:    Stress:     Feeling of Stress :    Social Connections:     Frequency of Communication with Friends and Family:     Frequency of Social Gatherings with Friends and Family:     Attends Latter day Services:     Active Member of Clubs or Organizations:     Attends Club or Organization Meetings:     Marital Status:    Intimate Partner Violence:     Fear of Current or Ex-Partner:     Emotionally Abused:     Physically Abused:     Sexually Abused:      Medications have been verified  Objective   /82   Pulse 85   Temp (!) 97 2 °F (36 2 °C)   Resp 18   Ht 5' 4" (1 626 m)   Wt 77 1 kg (170 lb)   SpO2 99%   BMI 29 18 kg/m²      Physical Exam   Physical Exam  Vitals and nursing note reviewed  Constitutional:       General: She is not in acute distress  Appearance: She is well-developed  HENT:      Head: Normocephalic and atraumatic  Right Ear: Tympanic membrane normal       Left Ear: A middle ear effusion is present  Nose: Nose normal  No mucosal edema or rhinorrhea  Right Sinus: No maxillary sinus tenderness or frontal sinus tenderness  Left Sinus: No maxillary sinus tenderness or frontal sinus tenderness  Mouth/Throat:      Pharynx: No oropharyngeal exudate or posterior oropharyngeal erythema  Eyes:      Conjunctiva/sclera: Conjunctivae normal    Cardiovascular:      Rate and Rhythm: Normal rate and regular rhythm  Heart sounds: Normal heart sounds  No murmur heard

## 2021-12-05 ENCOUNTER — OFFICE VISIT (OUTPATIENT)
Dept: URGENT CARE | Age: 36
End: 2021-12-05
Payer: COMMERCIAL

## 2021-12-05 VITALS
BODY MASS INDEX: 29.02 KG/M2 | HEART RATE: 83 BPM | WEIGHT: 170 LBS | TEMPERATURE: 98.8 F | DIASTOLIC BLOOD PRESSURE: 90 MMHG | RESPIRATION RATE: 20 BRPM | SYSTOLIC BLOOD PRESSURE: 134 MMHG | HEIGHT: 64 IN | OXYGEN SATURATION: 99 %

## 2021-12-05 DIAGNOSIS — L03.012 PARONYCHIA OF FINGER OF LEFT HAND: Primary | ICD-10-CM

## 2021-12-05 PROCEDURE — G0382 LEV 3 HOSP TYPE B ED VISIT: HCPCS | Performed by: PHYSICIAN ASSISTANT

## 2021-12-05 RX ORDER — CEPHALEXIN 500 MG/1
500 CAPSULE ORAL EVERY 8 HOURS SCHEDULED
Qty: 21 CAPSULE | Refills: 0 | Status: SHIPPED | COMMUNITY
Start: 2021-12-05 | End: 2021-12-12

## 2022-08-18 ENCOUNTER — APPOINTMENT (OUTPATIENT)
Dept: LAB | Facility: CLINIC | Age: 37
End: 2022-08-18
Payer: COMMERCIAL

## 2022-08-18 DIAGNOSIS — Z00.8 HEALTH EXAMINATION IN POPULATION SURVEY: ICD-10-CM

## 2022-08-18 LAB
CHOLEST SERPL-MCNC: 234 MG/DL
EST. AVERAGE GLUCOSE BLD GHB EST-MCNC: 114 MG/DL
HBA1C MFR BLD: 5.6 %
HDLC SERPL-MCNC: 55 MG/DL
LDLC SERPL CALC-MCNC: 148 MG/DL (ref 0–100)
NONHDLC SERPL-MCNC: 179 MG/DL
TRIGL SERPL-MCNC: 156 MG/DL

## 2022-08-18 PROCEDURE — 83036 HEMOGLOBIN GLYCOSYLATED A1C: CPT

## 2022-08-18 PROCEDURE — 80061 LIPID PANEL: CPT

## 2022-08-18 PROCEDURE — 36415 COLL VENOUS BLD VENIPUNCTURE: CPT

## 2023-07-28 ENCOUNTER — AMB VIDEO VISIT (OUTPATIENT)
Dept: OTHER | Facility: HOSPITAL | Age: 38
End: 2023-07-28
Payer: COMMERCIAL

## 2023-07-28 DIAGNOSIS — L50.9 HIVES: Primary | ICD-10-CM

## 2023-07-28 PROCEDURE — 99212 OFFICE O/P EST SF 10 MIN: CPT | Performed by: NURSE PRACTITIONER

## 2023-07-28 RX ORDER — METHYLPREDNISOLONE 4 MG/1
TABLET ORAL
Qty: 21 TABLET | Refills: 0 | Status: SHIPPED | OUTPATIENT
Start: 2023-07-28

## 2023-07-28 NOTE — CARE ANYWHERE EVISITS
Visit Summary for RODRICK NULL - Gender: Female - Date of Birth: [id number]  Date: [id number] - Duration: 7 minutes  Patient: RODRICK NULL  Provider: Palomo NUNEZ    Patient Contact Information  Address  [address] [place] [address]; [address] [place] [address]  [phone]    Visit Topics  Stress / Anxiety [Added By: Self - 2023-07-28]  Rash [Added By: Self - 2023-07-28]    Triage Questions   What is your current physical address in the event of a medical emergency? Answer []  Are you allergic to any medications? Answer []  Are you now or could you be pregnant? Answer []  Do you have any immune system compromise or chronic lung   disease? Answer []  Do you have any vulnerable family members in the home (infant, pregnant, cancer, elderly)? Answer []     Conversation Transcripts  [0A][0A] [Notification] You are connected with [facility].[0A][Notification] RODRICK NULL is located in [place]. [0A][Notification] RODRICK UNLL has shared health history. [facility] .[0A]    Diagnosis  Rash and other nonspecific skin eruption    Procedures  Value: [id number] Code: CPT-4 UNLISTED E&M SERVICE    Medications Prescribed    No prescriptions ordered    Electronically signed by: Melba Perez(NPI [id number])

## 2023-07-28 NOTE — PATIENT INSTRUCTIONS
Will start medrol dose pack. Continue benadryl. Follow up with PCP if no improvement. Go to Er with any worsening symptoms, chest pain, sob, tongue or lip swelling or difficulty breathing. Urticaria   WHAT YOU NEED TO KNOW:   Urticaria is also called hives. Hives can change size and shape, and appear anywhere on your skin. They can be mild or severe and last from a few minutes to a few days. Hives may be a sign of a severe allergic reaction called anaphylaxis that needs immediate treatment. Urticaria that lasts longer than 6 weeks may be a chronic condition that needs long-term treatment. DISCHARGE INSTRUCTIONS:   Call your local emergency number (911 in the [address]) for signs or symptoms of anaphylaxis,  such as trouble breathing, swelling in your mouth or throat, or wheezing. You may also have itching, a rash, or feel like you are going to faint. Return to the emergency department if:   Your heart is beating faster than it normally does. You have cramping or severe pain in your abdomen. Call your doctor if:   You have a fever. Your skin still itches 24 hours after you take your medicine. You still have hives after 7 days. Your joints are painful and swollen. You have questions or concerns about your condition or care. Medicines: You may need any of the following:  Epinephrine  is used to treat severe allergic reactions such as anaphylaxis. Antihistamines  decrease mild symptoms such as itching or a rash. Steroids  decrease redness, pain, and swelling. Take your medicine as directed. Contact your healthcare provider if you think your medicine is not helping or if you have side effects. Tell your provider if you are allergic to any medicine. Keep a list of the medicines, vitamins, and herbs you take. Include the amounts, and when and why you take them. Bring the list or the pill bottles to follow-up visits. Carry your medicine list with you in case of an emergency.     Steps to take for signs or symptoms of anaphylaxis:   Immediately  give 1 shot of epinephrine only into the outer thigh muscle. Leave the shot in place  as directed. Your provider may recommend you leave it in place for up to 10 seconds before you remove it. This helps make sure all of the epinephrine is delivered. Call 911 and go to the emergency department,  even if the shot improved symptoms. Do not drive yourself. Bring the used epinephrine shot with you. Safety precautions to take if you are at risk for anaphylaxis:   Keep 2 shots of epinephrine with you at all times. You may need a second shot, because epinephrine only works for about 20 minutes and symptoms may return. Your provider can show you and family members how to give the shot. Check the expiration date every month and replace it before it expires. Create an action plan. Your provider can help you create a written plan that explains the allergy and an emergency plan to treat a reaction. The plan explains when to give a second epinephrine shot if symptoms return or do not improve after the first. Give copies of the action plan and emergency instructions to family members, work and school staff, and  providers. Show them how to give a shot of epinephrine. Be careful when you exercise. If you have had exercise-induced anaphylaxis, do not exercise right after you eat. Stop exercising right away if you start to develop any signs or symptoms of anaphylaxis. You may first feel tired, warm, or have itchy skin. Hives, swelling, and severe breathing problems may develop if you continue to exercise. Carry medical alert identification. Wear medical alert jewelry or carry a card that explains the allergy. Ask your provider where to get these items. Keep a record of triggers and symptoms. Record everything you eat, drink, or apply to your skin for 3 weeks.  Include stressful events and what you were doing right before your hives started. Bring the record with you to follow-up visits with your provider. Manage urticaria:   Cool your skin. This may help decrease itching. Apply a cool pack to your hives. Dip a hand towel in cool water, wring it out, and place it on your hives. You may also soak your skin in a cool oatmeal bath. Do not rub your hives. This can irritate your skin and cause more hives. Wear loose clothing. Tight clothes may irritate your skin and cause more hives. Manage stress. Stress may trigger hives, or make them worse. Learn new ways to relax, such as deep breathing. Follow up with your healthcare provider as directed:  Write down your questions so you remember to ask them during your visits. © Copyright [employer] 2022 Information is for End User's use only and may not be sold, redistributed or otherwise used for commercial purposes. The above information is an  only. It is not intended as medical advice for individual conditions or treatments. Talk to your doctor, nurse or pharmacist before following any medical regimen to see if it is safe and effective for you.

## 2023-08-21 ENCOUNTER — APPOINTMENT (OUTPATIENT)
Dept: LAB | Facility: CLINIC | Age: 38
End: 2023-08-21

## 2023-08-21 DIAGNOSIS — Z00.8 ENCOUNTER FOR OTHER GENERAL EXAMINATION: ICD-10-CM

## 2023-08-21 LAB
CHOLEST SERPL-MCNC: 201 MG/DL
EST. AVERAGE GLUCOSE BLD GHB EST-MCNC: 111 MG/DL
HBA1C MFR BLD: 5.5 %
HDLC SERPL-MCNC: 48 MG/DL
LDLC SERPL CALC-MCNC: 108 MG/DL (ref 0–100)
NONHDLC SERPL-MCNC: 153 MG/DL
TRIGL SERPL-MCNC: 223 MG/DL

## 2023-08-21 PROCEDURE — 83036 HEMOGLOBIN GLYCOSYLATED A1C: CPT

## 2023-08-21 PROCEDURE — 36415 COLL VENOUS BLD VENIPUNCTURE: CPT

## 2023-08-21 PROCEDURE — 80061 LIPID PANEL: CPT

## 2023-09-06 ENCOUNTER — OFFICE VISIT (OUTPATIENT)
Dept: FAMILY MEDICINE CLINIC | Facility: CLINIC | Age: 38
End: 2023-09-06
Payer: COMMERCIAL

## 2023-09-06 VITALS
WEIGHT: 182.6 LBS | HEART RATE: 85 BPM | OXYGEN SATURATION: 99 % | DIASTOLIC BLOOD PRESSURE: 86 MMHG | HEIGHT: 65 IN | RESPIRATION RATE: 18 BRPM | SYSTOLIC BLOOD PRESSURE: 124 MMHG | TEMPERATURE: 98.5 F | BODY MASS INDEX: 30.42 KG/M2

## 2023-09-06 DIAGNOSIS — Z11.59 SCREENING FOR VIRAL DISEASE: ICD-10-CM

## 2023-09-06 DIAGNOSIS — E78.2 MIXED HYPERLIPIDEMIA: ICD-10-CM

## 2023-09-06 DIAGNOSIS — Z12.4 PAP SMEAR FOR CERVICAL CANCER SCREENING: ICD-10-CM

## 2023-09-06 DIAGNOSIS — E78.5 HYPERLIPIDEMIA, UNSPECIFIED HYPERLIPIDEMIA TYPE: Primary | ICD-10-CM

## 2023-09-06 DIAGNOSIS — E55.9 VITAMIN D DEFICIENCY: ICD-10-CM

## 2023-09-06 PROCEDURE — 99215 OFFICE O/P EST HI 40 MIN: CPT | Performed by: FAMILY MEDICINE

## 2023-09-06 NOTE — PROGRESS NOTES
Name: Nick Mendosa      : 1985      MRN: [id number]  Encounter Provider: Lynda Schmidt DO  Encounter Date: 2023   Encounter department: [facility]    Assessment & Plan     Discussed triglycerides and foods to decrease - gave diet sheet. Repeat lipids in about 6 months. BMI Counseling: Body mass index is 30.86 kg/m². The BMI is above normal. Nutrition recommendations include moderation in carbohydrate intake. Chief Complaint   Patient presents with   • Hyperlipidemia     1. Hyperlipidemia, unspecified hyperlipidemia type  -     TSH, 3rd generation; Future  -     CBC and differential; Future  -     Basic metabolic panel; Future  -     Hepatic function panel; Future  -     Lipid panel; Future; Expected date: 2024    2. Pap smear for cervical cancer screening  -     Ambulatory Referral to Obstetrics / Gynecology; Future    3. Screening for viral disease  -     : HIV 1/2 AB/AG w Reflex [facility] for 2 yr old and above; Future  -     Hepatitis C antibody; Future    4. Vitamin D deficiency  -     Vitamin D 25 hydroxy; Future    5. Mixed hyperlipidemia        Depression Screening and Follow-up Plan: Patient was screened for depression during today's encounter. They screened negative with a PHQ-2 score of 0. Subjective     Review labs. 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 Review of Systems   Constitutional: Negative. HENT: Negative. Eyes: Negative. Respiratory: Negative. Cardiovascular: Negative. Gastrointestinal: Negative. Genitourinary: Negative. Musculoskeletal: Negative. Skin: Negative. Neurological: Negative. Psychiatric/Behavioral: Negative. Past Medical History:   Diagnosis Date   • ARM (anorectal malformation)    • Concussion    • Fractures    • Head injury    • Seasonal allergies    • Sinus tachycardia      History reviewed. No pertinent surgical history.   Family History   Problem Relation Age of Onset   • Cancer Father    • Diabetes Father    • No Known Problems Brother    • Anemia Family    • Other Family         cervical dysplasia   • Diabetes Family    • Migraines Family    • Heart disease Family    • Hypertension Family    • Urinary tract infection Family      Social History     Socioeconomic History   • Marital status: /Civil Union     Spouse name: None   • Number of children: None   • Years of education: None   • Highest education level: None   Occupational History   • None   Tobacco Use   • Smoking status: Former   • Smokeless tobacco: Never   • Tobacco comments:     Socially years ago/8 years ago   Substance and Sexual Activity   • Alcohol use: Yes     Alcohol/week: 2.0 standard drinks of alcohol     Types: 1 Glasses of wine, 1 Cans of beer per week   • Drug use: No   • Sexual activity: Yes     Partners: Male     Birth control/protection: None   Other Topics Concern   • None   Social History Narrative   • None     Social Determinants of Health     Financial Resource Strain: Not on file   Food Insecurity: Not on file   Transportation Needs: Not on file   Physical Activity: Not on file   Stress: Not on file   Social Connections: Not on file   Intimate Partner Violence: Not on file   Housing Stability: Not on file     Current Outpatient Medications on File Prior to Visit   Medication Sig   • cetirizine (ZyrTEC) 10 mg tablet Take 10 mg by mouth daily   • DAILY MULTIPLE VITAMINS/IRON TABS Take by mouth     • methylprednisolone (MEDROL) 4 mg tablet Medrol dose pack Take as directed.      Allergies   Allergen Reactions   • Chocolate - Food Allergy Hives     Immunization History   Administered Date(s) Administered   • COVID-19 PFIZER VACCINE 0.3 ML IM 12/23/2020, 01/14/2021, 10/04/2021       Objective     /86 (BP Location: Left arm, Patient Position: Sitting, Cuff Size: Standard)   Pulse 85   Temp 98.5 °F (36.9 °C) (Oral)   Resp 18   Ht 5' 4.5" (1.638 m)   Wt 82.8 kg (182 lb 9.6 oz)   LMP 08/13/2023 (Approximate)   SpO2 99% BMI 30.86 kg/m²     Physical Exam  Constitutional:       Appearance: She is well-developed. HENT:      Head: Normocephalic and atraumatic. Right Ear: Tympanic membrane, ear canal and external ear normal.      Left Ear: Tympanic membrane, ear canal and external ear normal.      Nose: Nose normal.      Mouth/Throat:      Mouth: Mucous membranes are moist.      Pharynx: Oropharynx is clear. Eyes:      Conjunctiva/sclera: Conjunctivae normal.      Pupils: Pupils are equal, round, and reactive to light. Cardiovascular:      Rate and Rhythm: Normal rate and regular rhythm. Pulses: Normal pulses. Heart sounds: Normal heart sounds. Pulmonary:      Effort: Pulmonary effort is normal.      Breath sounds: Normal breath sounds. Abdominal:      General: Abdomen is flat. Bowel sounds are normal.      Palpations: Abdomen is soft. Musculoskeletal:      Cervical back: Normal range of motion and neck supple. Skin:     General: Skin is warm and dry. Neurological:      Mental Status: She is alert and oriented to person, place, and time. Deep Tendon Reflexes: Reflexes are normal and symmetric. Psychiatric:         Mood and Affect: Mood normal.         Behavior: Behavior normal.         Thought Content:  Thought content normal.         Judgment: Judgment normal.       Nidhi Bolden DO

## 2023-09-08 ENCOUNTER — APPOINTMENT (OUTPATIENT)
Dept: LAB | Facility: CLINIC | Age: 38
End: 2023-09-08
Payer: COMMERCIAL

## 2023-09-08 DIAGNOSIS — Z11.59 SCREENING FOR VIRAL DISEASE: ICD-10-CM

## 2023-09-08 DIAGNOSIS — E55.9 VITAMIN D DEFICIENCY: ICD-10-CM

## 2023-09-08 DIAGNOSIS — E78.5 HYPERLIPIDEMIA, UNSPECIFIED HYPERLIPIDEMIA TYPE: ICD-10-CM

## 2023-09-08 LAB
25(OH)D3 SERPL-MCNC: 24.3 NG/ML (ref 30–100)
ALBUMIN SERPL BCP-MCNC: 4 G/DL (ref 3.5–5)
ALP SERPL-CCNC: 76 U/L (ref 34–104)
ALT SERPL W P-5'-P-CCNC: 22 U/L (ref 7–52)
ANION GAP SERPL CALCULATED.3IONS-SCNC: 8 MMOL/L
AST SERPL W P-5'-P-CCNC: 17 U/L (ref 13–39)
BASOPHILS # BLD AUTO: 0.04 THOUSANDS/ÂΜL (ref 0–0.1)
BASOPHILS NFR BLD AUTO: 1 % (ref 0–1)
BILIRUB DIRECT SERPL-MCNC: 0.13 MG/DL (ref 0–0.2)
BILIRUB SERPL-MCNC: 0.63 MG/DL (ref 0.2–1)
BUN SERPL-MCNC: 10 MG/DL (ref 5–25)
CALCIUM SERPL-MCNC: 8.8 MG/DL (ref 8.4–10.2)
CHLORIDE SERPL-SCNC: 101 MMOL/L (ref 96–108)
CO2 SERPL-SCNC: 27 MMOL/L (ref 21–32)
CREAT SERPL-MCNC: 0.64 MG/DL (ref 0.6–1.3)
EOSINOPHIL # BLD AUTO: 0.17 THOUSAND/ÂΜL (ref 0–0.61)
EOSINOPHIL NFR BLD AUTO: 2 % (ref 0–6)
ERYTHROCYTE [DISTWIDTH] IN BLOOD BY AUTOMATED COUNT: 13.6 % (ref 11.6–15.1)
GFR SERPL CREATININE-BSD FRML MDRD: 113 ML/MIN/1.73SQ M
GLUCOSE P FAST SERPL-MCNC: 91 MG/DL (ref 65–99)
HCT VFR BLD AUTO: 41.6 % (ref 34.8–46.1)
HCV AB SER QL: NORMAL
HGB BLD-MCNC: 13.7 G/DL (ref 11.5–15.4)
HIV 1+2 AB+HIV1 P24 AG SERPL QL IA: NORMAL
HIV 2 AB SERPL QL IA: NORMAL
HIV1 AB SERPL QL IA: NORMAL
HIV1 P24 AG SERPL QL IA: NORMAL
IMM GRANULOCYTES # BLD AUTO: 0.01 THOUSAND/UL (ref 0–0.2)
IMM GRANULOCYTES NFR BLD AUTO: 0 % (ref 0–2)
LYMPHOCYTES # BLD AUTO: 1.81 THOUSANDS/ÂΜL (ref 0.6–4.47)
LYMPHOCYTES NFR BLD AUTO: 24 % (ref 14–44)
MCH RBC QN AUTO: 30.6 PG (ref 26.8–34.3)
MCHC RBC AUTO-ENTMCNC: 32.9 G/DL (ref 31.4–37.4)
MCV RBC AUTO: 93 FL (ref 82–98)
MONOCYTES # BLD AUTO: 0.6 THOUSAND/ÂΜL (ref 0.17–1.22)
MONOCYTES NFR BLD AUTO: 8 % (ref 4–12)
NEUTROPHILS # BLD AUTO: 5.08 THOUSANDS/ÂΜL (ref 1.85–7.62)
NEUTS SEG NFR BLD AUTO: 65 % (ref 43–75)
NRBC BLD AUTO-RTO: 0 /100 WBCS
PLATELET # BLD AUTO: 259 THOUSANDS/UL (ref 149–390)
PMV BLD AUTO: 9 FL (ref 8.9–12.7)
POTASSIUM SERPL-SCNC: 3.9 MMOL/L (ref 3.5–5.3)
PROT SERPL-MCNC: 6.9 G/DL (ref 6.4–8.4)
RBC # BLD AUTO: 4.47 MILLION/UL (ref 3.81–5.12)
SODIUM SERPL-SCNC: 136 MMOL/L (ref 135–147)
TSH SERPL DL<=0.05 MIU/L-ACNC: 2.39 UIU/ML (ref 0.45–4.5)
WBC # BLD AUTO: 7.71 THOUSAND/UL (ref 4.31–10.16)

## 2023-09-08 PROCEDURE — 84443 ASSAY THYROID STIM HORMONE: CPT

## 2023-09-08 PROCEDURE — 86803 HEPATITIS C AB TEST: CPT

## 2023-09-08 PROCEDURE — 85025 COMPLETE CBC W/AUTO DIFF WBC: CPT

## 2023-09-08 PROCEDURE — 80048 BASIC METABOLIC PNL TOTAL CA: CPT

## 2023-09-08 PROCEDURE — 87389 HIV-1 AG W/HIV-1&-2 AB AG IA: CPT

## 2023-09-08 PROCEDURE — 80076 HEPATIC FUNCTION PANEL: CPT

## 2023-09-08 PROCEDURE — 82306 VITAMIN D 25 HYDROXY: CPT

## 2023-09-08 PROCEDURE — 36415 COLL VENOUS BLD VENIPUNCTURE: CPT

## 2023-09-14 ENCOUNTER — OFFICE VISIT (OUTPATIENT)
Dept: OBGYN CLINIC | Facility: CLINIC | Age: 38
End: 2023-09-14
Payer: COMMERCIAL

## 2023-09-14 VITALS
HEIGHT: 65 IN | SYSTOLIC BLOOD PRESSURE: 138 MMHG | DIASTOLIC BLOOD PRESSURE: 80 MMHG | WEIGHT: 178.6 LBS | BODY MASS INDEX: 29.76 KG/M2

## 2023-09-14 DIAGNOSIS — Z01.419 ENCOUNTER FOR GYNECOLOGICAL EXAMINATION WITHOUT ABNORMAL FINDING: Primary | ICD-10-CM

## 2023-09-14 DIAGNOSIS — Z12.4 PAP SMEAR FOR CERVICAL CANCER SCREENING: ICD-10-CM

## 2023-09-14 PROCEDURE — G0476 HPV COMBO ASSAY CA SCREEN: HCPCS | Performed by: OBSTETRICS & GYNECOLOGY

## 2023-09-14 PROCEDURE — G0145 SCR C/V CYTO,THINLAYER,RESCR: HCPCS | Performed by: OBSTETRICS & GYNECOLOGY

## 2023-09-14 PROCEDURE — S0610 ANNUAL GYNECOLOGICAL EXAMINA: HCPCS | Performed by: OBSTETRICS & GYNECOLOGY

## 2023-09-14 NOTE — PROGRESS NOTES
Assessment/Plan:    No problem-specific Assessment & Plan notes found for this encounter. Diagnoses and all orders for this visit:    Encounter for gynecological examination without abnormal finding  -     Liquid-based pap, screening    Pap smear for cervical cancer screening  -     Ambulatory Referral to Obstetrics / Gynecology          Normal gynecological physical examination. Self-breast examination stressed. Discussed regular exercise, healthy diet, importance of vitamin D and calcium supplements. Discussed importance of sun block use during periods of prolonged sun exposure. Patient will be seen in 1 year for routine gynecologic and medical examination. Patient will call office for any problems, concerns, or issues which may arise during the interim. Subjective:          Cindy Palm is a 80-year-old female  who is receiving an annual exam to establish care after not receiving gynecological care since . LMP 23. Her periods are regular but she says she experiences spotting occasionally 1 to 2 days before her period as well as some cramping which she manages with ibuprofen. She denies abnormal vaginal discharge, abnormal vaginal bleeding, abdominal pain, pelvic pain, changes in urination, headaches, fever, chills. She states that she has an increase in bowel movements but attributes that to her diet change. She states that she is on a low-cholesterol diet after receiving abnormal cholesterol results recently. She says that the new diet is going well and she has lost weight since the start of her diet. She also tries to stay active. Patient was encouraged to call if she has any questions, concerns, or issues in the interim. Patient ID: Nick Mendosa is a 45 y.o. female who presents today for her annual gynecologic and medical examination    Menstrual status: Last menstrual period was 2023. Regular.  She notes cramping and spotting 1-2 days before the onset of menses occasionally. Vasomotor symptoms: Patient denies vasomotor symptoms. Patient reports normal appetite    Patient reports normal bowel and bladder habits    Patient denies any significant pelvic or abdominal pain    Patient denies any headaches, chest pain, shortness of breath fever shakes or chills    Patient denies any COVID 19 symptoms including cough or loss of taste or smell    COVID vaccine status: Patient is up-to-date with COVID vaccination. Medical problems:None    Colonoscopy status: Not indicated at this time. Mammogram status: Not indicated at this time. The following portions of the patient's history were reviewed and updated as appropriate: allergies, current medications, past family history, past medical history, past social history, past surgical history and problem list.    Review of Systems   Constitutional: Negative. Negative for appetite change, diaphoresis, fatigue, fever and unexpected weight change. HENT: Negative. Eyes: Negative. Respiratory: Negative. Cardiovascular: Negative. Gastrointestinal: Negative. Negative for abdominal pain, blood in stool, constipation, diarrhea, nausea and vomiting. Endocrine: Negative. Negative for cold intolerance and heat intolerance. Genitourinary: Negative. Negative for dysuria, frequency, hematuria, urgency, vaginal bleeding, vaginal discharge and vaginal pain. Musculoskeletal: Negative. Skin: Negative. Allergic/Immunologic: Negative. Neurological: Negative. Hematological: Negative. Negative for adenopathy. Psychiatric/Behavioral: Negative. Objective:      /80   Ht 5' 4.5" (1.638 m)   Wt 81 kg (178 lb 9.6 oz)   LMP 08/13/2023 (Approximate)   BMI 30.18 kg/m²          Physical Exam  Constitutional:       General: She is not in acute distress. Appearance: Normal appearance. She is well-developed. She is not diaphoretic. HENT:      Head: Normocephalic and atraumatic.    Eyes:      Pupils: Pupils are equal, round, and reactive to light. Cardiovascular:      Rate and Rhythm: Normal rate and regular rhythm. Heart sounds: Normal heart sounds. No murmur heard. No friction rub. No gallop. Pulmonary:      Effort: Pulmonary effort is normal.      Breath sounds: Normal breath sounds. Chest:   Breasts:     Breasts are symmetrical.      Right: No inverted nipple, mass, nipple discharge, skin change or tenderness. Left: No inverted nipple, mass, nipple discharge, skin change or tenderness. Abdominal:      General: Bowel sounds are normal.      Palpations: Abdomen is soft. Genitourinary:     General: Normal vulva. Exam position: Supine. Labia:         Right: No rash, tenderness, lesion or injury. Left: No rash, tenderness, lesion or injury. Urethra: No prolapse, urethral swelling or urethral lesion. Vagina: Normal. No vaginal discharge, erythema, tenderness or bleeding. Cervix: No discharge or friability. Uterus: Not enlarged and not tender. Adnexa:         Right: No mass, tenderness or fullness. Left: No mass, tenderness or fullness. Rectum: Normal. Guaiac result negative. Musculoskeletal:         General: Normal range of motion. Cervical back: Normal range of motion and neck supple. Lymphadenopathy:      Cervical: No cervical adenopathy. Upper Body:      Right upper body: No supraclavicular adenopathy. Left upper body: No supraclavicular adenopathy. Skin:     General: Skin is warm and dry. Findings: No rash. Neurological:      General: No focal deficit present. Mental Status: She is alert and oriented to person, place, and time. Psychiatric:         Mood and Affect: Mood normal.         Speech: Speech normal.         Behavior: Behavior normal.         Thought Content:  Thought content normal.         Judgment: Judgment normal.

## 2023-09-14 NOTE — PATIENT INSTRUCTIONS
Normal gynecological physical examination. Self-breast examination stressed. Discussed regular exercise, healthy diet, importance of vitamin D and calcium supplements. Discussed importance of sun block use during periods of prolonged sun exposure. Patient will be seen in 1 year for routine gynecologic and medical examination. Patient will call office for any problems, concerns, or issues which may arise during the interim.

## 2023-09-16 LAB
HPV HR 12 DNA CVX QL NAA+PROBE: NEGATIVE
HPV16 DNA CVX QL NAA+PROBE: NEGATIVE
HPV18 DNA CVX QL NAA+PROBE: NEGATIVE

## 2023-09-21 LAB
LAB AP GYN PRIMARY INTERPRETATION: NORMAL
Lab: NORMAL

## 2024-09-05 ENCOUNTER — TELEPHONE (OUTPATIENT)
Age: 39
End: 2024-09-05

## 2024-09-05 ENCOUNTER — APPOINTMENT (OUTPATIENT)
Dept: LAB | Facility: CLINIC | Age: 39
End: 2024-09-05

## 2024-09-05 DIAGNOSIS — Z00.8 HEALTH EXAMINATION IN POPULATION SURVEY: ICD-10-CM

## 2024-09-05 LAB
CHOLEST SERPL-MCNC: 239 MG/DL
EST. AVERAGE GLUCOSE BLD GHB EST-MCNC: 108 MG/DL
HBA1C MFR BLD: 5.4 %
HDLC SERPL-MCNC: 53 MG/DL
LDLC SERPL CALC-MCNC: 130 MG/DL (ref 0–100)
NONHDLC SERPL-MCNC: 186 MG/DL
TRIGL SERPL-MCNC: 279 MG/DL

## 2024-09-05 PROCEDURE — 80061 LIPID PANEL: CPT

## 2024-09-05 PROCEDURE — 83036 HEMOGLOBIN GLYCOSYLATED A1C: CPT

## 2024-09-05 PROCEDURE — 36415 COLL VENOUS BLD VENIPUNCTURE: CPT

## 2024-09-05 NOTE — TELEPHONE ENCOUNTER
Appointment scheduled with provider.    Reason: BP & weight check [facility]    Symptoms: n/a    Provider: NV    Date/Time:  9/6 @ 8:15

## 2024-09-10 ENCOUNTER — OFFICE VISIT (OUTPATIENT)
Dept: FAMILY MEDICINE CLINIC | Facility: CLINIC | Age: 39
End: 2024-09-10
Payer: COMMERCIAL

## 2024-09-10 VITALS
BODY MASS INDEX: 32.02 KG/M2 | TEMPERATURE: 97.4 F | WEIGHT: 192.2 LBS | HEIGHT: 65 IN | DIASTOLIC BLOOD PRESSURE: 84 MMHG | HEART RATE: 103 BPM | OXYGEN SATURATION: 96 % | SYSTOLIC BLOOD PRESSURE: 126 MMHG

## 2024-09-10 DIAGNOSIS — E55.9 VITAMIN D DEFICIENCY: ICD-10-CM

## 2024-09-10 DIAGNOSIS — Z00.00 ANNUAL PHYSICAL EXAM: Primary | ICD-10-CM

## 2024-09-10 DIAGNOSIS — E78.5 HYPERLIPIDEMIA, UNSPECIFIED HYPERLIPIDEMIA TYPE: ICD-10-CM

## 2024-09-10 PROCEDURE — 99395 PREV VISIT EST AGE 18-39: CPT | Performed by: FAMILY MEDICINE

## 2024-09-10 RX ORDER — CETIRIZINE HYDROCHLORIDE 10 MG/1
10 TABLET, CHEWABLE ORAL AS NEEDED
COMMUNITY

## 2024-09-10 NOTE — PATIENT INSTRUCTIONS
"Patient Education     Routine physical for adults   The Basics   Written by the doctors and editors at [facility]   What is a physical? -- A physical is a routine visit, or \"check-up,\" with your doctor. You might also hear it called a \"wellness visit\" or \"preventive visit.\"  During each visit, the doctor will:   Ask about your physical and mental health   Ask about your habits, behaviors, and lifestyle   Do an exam   Give you vaccines if needed   Talk to you about any medicines you take   Give advice about your health   Answer your questions  Getting regular check-ups is an important part of taking care of your health. It can help your doctor find and treat any problems you have. But it's also important for preventing health problems.  A routine physical is different from a \"sick visit.\" A sick visit is when you see a doctor because of a health concern or problem. Since physicals are scheduled ahead of time, you can think about what you want to ask the doctor.  How often should I get a physical? -- It depends on your age and health. In general, for people age 21 years and older:   If you are younger than 50 years, you might be able to get a physical every 3 years.   If you are 50 years or older, your doctor might recommend a physical every year.  If you have an ongoing health condition, like diabetes or high blood pressure, your doctor will probably want to see you more often.  What happens during a physical? -- In general, each visit will include:   Physical exam - The doctor or nurse will check your height, weight, heart rate, and blood pressure. They will also look at your eyes and ears. They will ask about how you are feeling and whether you have any symptoms that bother you.   Medicines - It's a good idea to bring a list of all the medicines you take to each doctor visit. Your doctor will talk to you about your medicines and answer any questions. Tell them if you are having any side effects that bother you. You " "should also tell them if you are having trouble paying for any of your medicines.   Habits and behaviors - This includes:   Your diet   Your exercise habits   Whether you smoke, drink alcohol, or use drugs   Whether you are sexually active   Whether you feel safe at home  Your doctor will talk to you about things you can do to improve your health and lower your risk of health problems. They will also offer help and support. For example, if you want to quit smoking, they can give you advice and might prescribe medicines. If you want to improve your diet or get more physical activity, they can help you with this, too.   Lab tests, if needed - The tests you get will depend on your age and situation. For example, your doctor might want to check your:   Cholesterol   Blood sugar   Iron level   Vaccines - The recommended vaccines will depend on your age, health, and what vaccines you already had. Vaccines are very important because they can prevent certain serious or deadly infections.   Discussion of screening - \"Screening\" means checking for diseases or other health problems before they cause symptoms. Your doctor can recommend screening based on your age, risk, and preferences. This might include tests to check for:   Cancer, such as breast, prostate, cervical, ovarian, colorectal, prostate, lung, or skin cancer   Sexually transmitted infections, such as chlamydia and gonorrhea   Mental health conditions like depression and anxiety  Your doctor will talk to you about the different types of screening tests. They can help you decide which screenings to have. They can also explain what the results might mean.   Answering questions - The physical is a good time to ask the doctor or nurse questions about your health. If needed, they can refer you to other doctors or specialists, too.  Adults older than 65 years often need other care, too. As you get older, your doctor will talk to you about:   How to prevent falling at " home   Hearing or vision tests   Memory testing   How to take your medicines safely   Making sure that you have the help and support you need at home  All topics are updated as new evidence becomes available and our peer review process is complete.  This topic retrieved from [employer] on: May 02, 2024.  Topic 761499 Version 1.0  Release: 32.4.3 - C32.122  © 2024 UpToDate, Inc. and/or its affiliates. All rights reserved.  Consumer Information Use and Disclaimer   Disclaimer: This generalized information is a limited summary of diagnosis, treatment, and/or medication information. It is not meant to be comprehensive and should be used as a tool to help the user understand and/or assess potential diagnostic and treatment options. It does NOT include all information about conditions, treatments, medications, side effects, or risks that may apply to a specific patient. It is not intended to be medical advice or a substitute for the medical advice, diagnosis, or treatment of a health care provider based on the health care provider's examination and assessment of a patient's specific and unique circumstances. Patients must speak with a health care provider for complete information about their health, medical questions, and treatment options, including any risks or benefits regarding use of medications. This information does not endorse any treatments or medications as safe, effective, or approved for treating a specific patient. UpToDate, Inc. and its affiliates disclaim any warranty or liability relating to this information or the use thereof.The use of this information is governed by the Terms of Use, available at https://www.[employer].com/en/know/clinical-effectiveness-terms. 2024© UpToDate, Inc. and its affiliates and/or licensors. All rights reserved.  Copyright   © 2024 UpToDate, Inc. and/or its affiliates. All rights reserved.

## 2024-09-10 NOTE — PROGRESS NOTES
Adult Annual Physical  Name: Rosa Maria Rivera      : 1985      MRN: [id number]  Encounter Provider: Reuben Gavin DO  Encounter Date: 9/10/2024   Encounter department: [facility]    Assessment & Plan  Annual physical exam         Hyperlipidemia, unspecified hyperlipidemia type    Orders:    Lipid Panel with Direct LDL reflex; Future    Vitamin D deficiency    Orders:    Vitamin D 25 hydroxy; Future      Immunizations and preventive care screenings were discussed with patient today. Appropriate education was printed on patient's after visit summary.    Counseling:  Alcohol/drug use: discussed moderation in alcohol intake, the recommendations for healthy alcohol use, and avoidance of illicit drug use.  Dental Health: discussed importance of regular tooth brushing, flossing, and dental visits.  Injury prevention: discussed safety/seat belts, safety helmets, smoke detectors, carbon dioxide detectors, and smoking near bedding or upholstery.  Sexual health: discussed sexually transmitted diseases, partner selection, use of condoms, avoidance of unintended pregnancy, and contraceptive alternatives.  Exercise: the importance of regular exercise/physical activity was discussed. Recommend exercise 3-5 times per week for at least 30 minutes.       Depression Screening and Follow-up Plan: Patient was screened for depression during today's encounter. They screened negative with a PHQ-2 score of 0.        History of Present Illness     Adult Annual Physical:  Patient presents for annual physical. F/u lipid and vit D in 6 months..     Diet and Physical Activity:  - Diet/Nutrition: well balanced diet.  - Exercise: walking, 5-7 times a week on average and 30-60 minutes on average.    Depression Screening:  - PHQ-2 Score: 0    General Health:  - Sleep: 7-8 hours of sleep on average and sleeps well.  - Hearing: normal hearing bilateral ears.  - Vision: no vision problems, wears glasses and most recent eye exam < 1 year  ago.  - Dental: brushes teeth twice daily and no dental visits for > 1 year.    /GYN Health:  - Follows with GYN: yes.   - Last menstrual cycle: 8/15/2024.   - History of STDs: no  - Contraception: barrier methods.      Advanced Care Planning:  - Has an advanced directive?: no    - Has a durable medical POA?: no    - ACP document given to patient?: no      Review of Systems   Constitutional:  Negative for chills and fever.   HENT:  Negative for ear pain and sore throat.    Eyes:  Negative for pain and visual disturbance.   Respiratory:  Negative for cough and shortness of breath.    Cardiovascular:  Negative for chest pain and palpitations.   Gastrointestinal:  Negative for abdominal pain and vomiting.   Genitourinary:  Negative for dysuria and hematuria.   Musculoskeletal:  Negative for arthralgias and back pain.   Skin:  Negative for color change and rash.   Neurological:  Negative for seizures and syncope.   All other systems reviewed and are negative.    Medical History Reviewed by provider this encounter:  Tobacco  Allergies  Meds  Problems  Med Hx  Surg Hx  Fam Hx       Current Outpatient Medications on File Prior to Visit   Medication Sig Dispense Refill    cetirizine (ZyrTEC) 10 MG chewable tablet Chew 10 mg as needed for allergies      DAILY MULTIPLE VITAMINS/IRON TABS Take by mouth         No current facility-administered medications on file prior to visit.      Social History     Tobacco Use    Smoking status: Former    Smokeless tobacco: Never    Tobacco comments:     Socially years ago/8 years ago   Vaping Use    Vaping status: Never Used   Substance and Sexual Activity    Alcohol use: Yes     Alcohol/week: 2.0 standard drinks of alcohol     Types: 1 Glasses of wine, 1 Cans of beer per week    Drug use: No    Sexual activity: Yes     Partners: Male     Birth control/protection: None       Objective     /84 (BP Location: Left arm, Patient Position: Sitting, Cuff Size: Standard)   Pulse  "103   Temp (!) 97.4 °F (36.3 °C) (Temporal)   Ht 5' 4.5\" (1.638 m)   Wt 87.2 kg (192 lb 3.2 oz)   LMP 08/15/2024   SpO2 96%   BMI 32.48 kg/m²     Physical Exam  Vitals reviewed.   Constitutional:       General: She is not in acute distress.     Appearance: Normal appearance. She is well-developed.   HENT:      Head: Normocephalic and atraumatic.   Eyes:      Conjunctiva/sclera: Conjunctivae normal.   Cardiovascular:      Rate and Rhythm: Normal rate and regular rhythm.      Pulses: Normal pulses.      Heart sounds: Normal heart sounds. No murmur heard.  Pulmonary:      Effort: Pulmonary effort is normal. No respiratory distress.      Breath sounds: Normal breath sounds.   Abdominal:      Palpations: Abdomen is soft.      Tenderness: There is no abdominal tenderness.   Musculoskeletal:         General: No swelling.      Cervical back: Neck supple.   Skin:     General: Skin is warm and dry.      Capillary Refill: Capillary refill takes less than 2 seconds.   Neurological:      Mental Status: She is alert.   Psychiatric:         Mood and Affect: Mood normal.       Administrative Statements   I have spent a total time of 40 minutes in caring for this patient on the day of the visit/encounter including Diagnostic results, Prognosis, Risks and benefits of tx options, Instructions for management, Counseling / Coordination of care, Documenting in the medical record, Reviewing / ordering tests, medicine, procedures  , and Obtaining or reviewing history  .  "

## 2025-08-04 ENCOUNTER — OFFICE VISIT (OUTPATIENT)
Dept: FAMILY MEDICINE CLINIC | Facility: CLINIC | Age: 40
End: 2025-08-04
Payer: COMMERCIAL

## 2025-08-04 VITALS
HEART RATE: 114 BPM | DIASTOLIC BLOOD PRESSURE: 86 MMHG | HEIGHT: 65 IN | TEMPERATURE: 97.9 F | OXYGEN SATURATION: 98 % | WEIGHT: 196 LBS | SYSTOLIC BLOOD PRESSURE: 130 MMHG | BODY MASS INDEX: 32.65 KG/M2

## 2025-08-04 DIAGNOSIS — L70.0 WHITE HEAD: Primary | ICD-10-CM

## 2025-08-04 PROCEDURE — 99213 OFFICE O/P EST LOW 20 MIN: CPT | Performed by: FAMILY MEDICINE

## 2025-08-04 PROCEDURE — 10140 I&D HMTMA SEROMA/FLUID COLLJ: CPT | Performed by: FAMILY MEDICINE

## 2025-08-04 RX ORDER — OLOPATADINE HYDROCHLORIDE 1 MG/ML
1 SOLUTION OPHTHALMIC 2 TIMES DAILY
COMMUNITY

## 2025-08-15 ENCOUNTER — APPOINTMENT (OUTPATIENT)
Dept: LAB | Facility: CLINIC | Age: 40
End: 2025-08-15
Payer: COMMERCIAL

## 2025-08-22 ENCOUNTER — RESULTS FOLLOW-UP (OUTPATIENT)
Dept: FAMILY MEDICINE CLINIC | Facility: CLINIC | Age: 40
End: 2025-08-22